# Patient Record
Sex: FEMALE | Race: WHITE | NOT HISPANIC OR LATINO | Employment: FULL TIME | URBAN - METROPOLITAN AREA
[De-identification: names, ages, dates, MRNs, and addresses within clinical notes are randomized per-mention and may not be internally consistent; named-entity substitution may affect disease eponyms.]

---

## 2017-02-08 ENCOUNTER — HOSPITAL ENCOUNTER (OUTPATIENT)
Dept: URGENT CARE | Facility: CLINIC | Age: 22
Setting detail: SPECIMEN
Discharge: HOME | End: 2017-02-08
Attending: FAMILY MEDICINE | Admitting: FAMILY MEDICINE

## 2017-02-08 LAB
BACTERIA SPEC AEROBE CULT: NORMAL
Lab: NORMAL
MICRO REPORT STATUS: NORMAL
SPECIMEN SOURCE: NORMAL

## 2017-02-15 ENCOUNTER — HOSPITAL ENCOUNTER (OUTPATIENT)
Dept: PREADMISSION TESTING | Facility: HOSPITAL | Age: 22
Discharge: HOME | End: 2017-02-15
Attending: ORTHOPAEDIC SURGERY | Admitting: ORTHOPAEDIC SURGERY

## 2017-02-15 LAB
AMPHETAMINES UR QL SCN: NEGATIVE
ANION GAP SERPL CALC-SCNC: 8.6 MMOL/L (ref 10–20)
BARBITURATES UR QL SCN: NEGATIVE
BASOPHILS # BLD AUTO: 0 10*3/UL (ref 0–0.2)
BASOPHILS NFR BLD AUTO: 1 % (ref 0–2)
BENZODIAZ UR QL SCN: NEGATIVE
BUN SERPL-MCNC: 4 MG/DL (ref 8–20)
BUN/CREAT SERPL: 5 (ref 5.4–26.2)
BZE UR QL SCN: NEGATIVE
CALCIUM SERPL-MCNC: 9.1 MG/DL (ref 8.9–10.3)
CHLORIDE SERPL-SCNC: 105 MMOL/L (ref 101–111)
CONV CO2: 25 MMOL/L (ref 22–32)
CREAT 24H UR-MCNC: NORMAL MG/DL
CREAT UR-MCNC: 0.8 MG/DL (ref 0.4–1)
DIFFERENTIAL METHOD BLD: (no result)
EOSINOPHIL # BLD AUTO: 0.1 10*3/UL (ref 0–0.3)
EOSINOPHIL # BLD AUTO: 1 % (ref 0–3)
ERYTHROCYTE [DISTWIDTH] IN BLOOD BY AUTOMATED COUNT: 13 % (ref 11.5–14.5)
GLUCOSE SERPL-MCNC: 81 MG/DL (ref 65–99)
HCT VFR BLD AUTO: 37.4 % (ref 35–49)
HGB BLD-MCNC: 12.8 G/DL (ref 12–15)
LYMPHOCYTES # BLD AUTO: 1.4 10*3/UL (ref 0.8–4.8)
LYMPHOCYTES NFR BLD AUTO: 22 % (ref 18–42)
MCH RBC QN AUTO: 30.1 PG (ref 26–32)
MCHC RBC AUTO-ENTMCNC: 34.3 G/DL (ref 32–36)
MCV RBC AUTO: 87.7 FL (ref 80–94)
METHADONE UR QL SCN: NEGATIVE
MONOCYTES # BLD AUTO: 0.8 10*3/UL (ref 0.1–1.3)
MONOCYTES NFR BLD AUTO: 12 % (ref 2–11)
NEUTROPHILS # BLD AUTO: 4 10*3/UL (ref 2.3–8.6)
NEUTROPHILS NFR BLD AUTO: 64 % (ref 50–75)
NRBC BLD AUTO-RTO: 0 /100{WBCS}
NRBC/RBC NFR BLD MANUAL: 0 10*3/UL
OPIATES TESTED UR SCN: NEGATIVE
PCP UR QL: NEGATIVE
PLATELET # BLD AUTO: 200 10*3/UL (ref 150–450)
PMV BLD AUTO: 9.1 FL (ref 7.4–10.4)
POTASSIUM SERPL-SCNC: 3.6 MMOL/L (ref 3.6–5.1)
RBC # BLD AUTO: 4.27 10*6/UL (ref 4–5.4)
SODIUM SERPL-SCNC: 135 MMOL/L (ref 136–144)
THC SERPLBLD CFM-MCNC: NEGATIVE NG/ML
WBC # BLD AUTO: 6.3 10*3/UL (ref 4.5–11.5)

## 2018-08-14 ENCOUNTER — HOSPITAL ENCOUNTER (OUTPATIENT)
Dept: FAMILY MEDICINE CLINIC | Facility: CLINIC | Age: 23
Setting detail: SPECIMEN
Discharge: HOME | End: 2018-08-14
Attending: PHYSICIAN ASSISTANT | Admitting: PHYSICIAN ASSISTANT

## 2018-08-14 LAB
AMPICILLIN SUSC ISLT: NORMAL
AZTREONAM SUSC ISLT: NORMAL
BACTERIA ISLT: NORMAL
BACTERIA SPEC AEROBE CULT: NORMAL
CEFAZOLIN SUSC ISLT: NORMAL
CEFEPIME SUSC ISLT: NORMAL
CEFTAZIDIME SUSC ISLT: NORMAL
CEFTRIAXONE SUSC ISLT: NORMAL
CIPROFLOXACIN SUSC ISLT: NORMAL
ERTAPENEM SUSC ISLT: NORMAL
GRAM STN SPEC: NORMAL
LEVOFLOXACIN SUSC ISLT: NORMAL
Lab: NORMAL
MEROPENEM SUSC ISLT: NORMAL
MICRO REPORT STATUS: NORMAL
PIP+TAZO SUSC ISLT: NORMAL
SPECIMEN SOURCE: NORMAL
SUSC METH SPEC: NORMAL
TETRACYCLINE SUSC ISLT: NORMAL
TOBRAMYCIN SUSC ISLT: NORMAL
TRIMETHOPRIM/SULFA: NORMAL

## 2019-05-02 ENCOUNTER — HOSPITAL ENCOUNTER (OUTPATIENT)
Dept: LAB | Facility: HOSPITAL | Age: 24
Discharge: HOME | End: 2019-05-02
Attending: OBSTETRICS & GYNECOLOGY | Admitting: OBSTETRICS & GYNECOLOGY

## 2019-07-03 ENCOUNTER — OFFICE VISIT (OUTPATIENT)
Dept: FAMILY MEDICINE CLINIC | Facility: CLINIC | Age: 24
End: 2019-07-03

## 2019-07-03 VITALS
HEIGHT: 60 IN | OXYGEN SATURATION: 100 % | WEIGHT: 114 LBS | HEART RATE: 100 BPM | SYSTOLIC BLOOD PRESSURE: 116 MMHG | DIASTOLIC BLOOD PRESSURE: 79 MMHG | BODY MASS INDEX: 22.38 KG/M2 | RESPIRATION RATE: 16 BRPM | TEMPERATURE: 98.1 F

## 2019-07-03 DIAGNOSIS — G43.109 MIGRAINE WITH AURA AND WITHOUT STATUS MIGRAINOSUS, NOT INTRACTABLE: Primary | ICD-10-CM

## 2019-07-03 PROBLEM — L08.9 WOUND INFECTION: Status: ACTIVE | Noted: 2018-08-14

## 2019-07-03 PROBLEM — S40.819S: Status: ACTIVE | Noted: 2018-08-14

## 2019-07-03 PROBLEM — F41.9 ANXIETY: Status: ACTIVE | Noted: 2017-10-26

## 2019-07-03 PROBLEM — G89.29 CHRONIC HEADACHE: Status: ACTIVE | Noted: 2017-11-29

## 2019-07-03 PROBLEM — R63.5 WEIGHT GAIN: Status: ACTIVE | Noted: 2018-02-23

## 2019-07-03 PROBLEM — J45.909 ASTHMA: Status: ACTIVE | Noted: 2017-11-20

## 2019-07-03 PROBLEM — IMO0002: Status: ACTIVE | Noted: 2018-08-14

## 2019-07-03 PROBLEM — Z30.9 CONTRACEPTION MANAGEMENT: Status: ACTIVE | Noted: 2018-02-23

## 2019-07-03 PROBLEM — Z87.440 HX OF RECURRENT URINARY TRACT INFECTION: Status: ACTIVE | Noted: 2017-02-08

## 2019-07-03 PROBLEM — R51.9 CHRONIC HEADACHE: Status: ACTIVE | Noted: 2017-11-29

## 2019-07-03 PROBLEM — T14.8XXA WOUND INFECTION: Status: ACTIVE | Noted: 2018-08-14

## 2019-07-03 PROCEDURE — 99213 OFFICE O/P EST LOW 20 MIN: CPT | Performed by: FAMILY MEDICINE

## 2019-07-03 RX ORDER — ALBUTEROL SULFATE 90 UG/1
2 AEROSOL, METERED RESPIRATORY (INHALATION) EVERY 4 HOURS PRN
COMMUNITY
Start: 2017-11-20

## 2019-07-03 RX ORDER — PROMETHAZINE HYDROCHLORIDE 12.5 MG/1
25 TABLET ORAL EVERY 6 HOURS PRN
COMMUNITY
End: 2019-07-03 | Stop reason: SDUPTHER

## 2019-07-03 RX ORDER — ONDANSETRON 4 MG/1
TABLET, ORALLY DISINTEGRATING ORAL
COMMUNITY
Start: 2019-06-10 | End: 2020-03-19

## 2019-07-03 RX ORDER — TOPIRAMATE 25 MG/1
TABLET ORAL
Qty: 70 TABLET | Refills: 0 | Status: SHIPPED | OUTPATIENT
Start: 2019-07-03 | End: 2019-09-06

## 2019-07-03 RX ORDER — PREDNISONE 10 MG/1
TABLET ORAL SEE ADMIN INSTRUCTIONS
Refills: 0 | COMMUNITY
Start: 2019-06-10 | End: 2019-07-03

## 2019-07-03 RX ORDER — DIHYDROERGOTAMINE MESYLATE 4 MG/ML
1 SPRAY NASAL AS NEEDED
Qty: 1 EACH | Refills: 12
Start: 2019-07-03 | End: 2019-07-10 | Stop reason: SDUPTHER

## 2019-07-03 RX ORDER — NORELGESTROMIN AND ETHINYL ESTRADIOL 150; 35 UG/D; UG/D
PATCH TRANSDERMAL
Refills: 11 | COMMUNITY
Start: 2019-06-08 | End: 2020-03-19

## 2019-07-03 RX ORDER — PROMETHAZINE HYDROCHLORIDE 12.5 MG/1
12.5 TABLET ORAL EVERY 6 HOURS PRN
Qty: 40 TABLET | Refills: 0 | Status: SHIPPED | OUTPATIENT
Start: 2019-07-03

## 2019-07-03 RX ORDER — HYDROCODONE BITARTRATE AND ACETAMINOPHEN 7.5; 325 MG/1; MG/1
TABLET ORAL EVERY 4 HOURS
COMMUNITY
Start: 2018-08-14 | End: 2019-07-03

## 2019-07-03 RX ORDER — IBUPROFEN 600 MG/1
TABLET ORAL
COMMUNITY
Start: 2018-08-10 | End: 2019-09-06

## 2019-07-03 RX ORDER — RIZATRIPTAN BENZOATE 10 MG/1
10 TABLET, ORALLY DISINTEGRATING ORAL ONCE AS NEEDED
COMMUNITY
End: 2019-07-03

## 2019-07-03 NOTE — PROGRESS NOTES
Subjective   Argenis Decker is a 23 y.o. female.     Chief Complaint   Patient presents with   • Migraine         Current Outpatient Medications:   •  albuterol sulfate HFA (PROAIR HFA) 108 (90 Base) MCG/ACT inhaler, PROAIR  (90 Base) MCG/ACT AERS, Disp: , Rfl:   •  fluticasone-salmeterol (ADVAIR) 100-50 MCG/DOSE DISKUS, INHALE 1 PUFF BY MOUTH EVERY TWELVE HOURS, Disp: , Rfl: 1  •  ibuprofen (IBU) 600 MG tablet,  MG TABS, Disp: , Rfl:   •  ondansetron ODT (ZOFRAN-ODT) 4 MG disintegrating tablet, , Disp: , Rfl:   •  promethazine (PHENERGAN) 12.5 MG tablet, Take 1 tablet by mouth Every 6 (Six) Hours As Needed for Nausea or Vomiting., Disp: 40 tablet, Rfl: 0  •  XULANE 150-35 MCG/24HR, APPLY 1 PATCH EXTERNALLY ONE TIME A WEEK, Disp: , Rfl: 11  •  dihydroergotamine (MIGRANAL) 4 MG/ML nasal spray, 1 spray into the nostril(s) as directed by provider As Needed for Migraine. Spray in each nostril prh MIGRAINE and may repeat x 1 in 15min, Disp: 1 each, Rfl: 12  •  topiramate (TOPAMAX) 25 MG tablet, 25mg po qhs x 1 wk then go to 1 po bid x 1wk then 1 po qam and 2 po qhs x 1 wk then go to 2 po bid, Disp: 70 tablet, Rfl: 0    Past Medical History:   Diagnosis Date   • Angioedema    • Anxiety    • Asthma    • Migraines    • Neck pain    • Panic attacks    • Schwannoma    • Seasonal depression (CMS/HCC)    • Visual impairment     computer/ studying only       Past Surgical History:   Procedure Laterality Date   • BREAST AUGMENTATION Bilateral 10/08/2015   • CERVICAL SPINE POSTERIOR  02/20/2017    removed tumor from post c-spine   • EAR TUBES  1997   • WISDOM TOOTH EXTRACTION  2013       Family History   Problem Relation Age of Onset   • Hypertension Mother    • Cancer Mother         BCC   • Hypertension Father    • Other Father         GLAUCOMA   • Heart disease Maternal Grandmother    • Cancer Maternal Grandfather         Skin Cancer   • Cancer Paternal Grandmother         Skin, Breast and Lung Cancer   • Heart  disease Paternal Grandfather    • Heart disease Other         Grandparents   • Asthma Sister    • Other Sister         ADD       Social History     Socioeconomic History   • Marital status: Single     Spouse name: Not on file   • Number of children: Not on file   • Years of education: Not on file   • Highest education level: Not on file   Tobacco Use   • Smoking status: Never Smoker   • Smokeless tobacco: Never Used   • Tobacco comment: Passive Smoke: N   Substance and Sexual Activity   • Alcohol use: Yes     Alcohol/week: 0.6 oz     Types: 1 Cans of beer per week     Frequency: Never   • Drug use: No       22y/o C female here for f/u on HA's w/  Mom at her side    Pt has had migraines since HS---she had sx for a cervical mass in 2017 and noticed no HA's x 4-5mos; then HA's started coming back and on Monday last week she had a MIGRAINE x 6 days straight----Pt tried excedrin at first w/ a ms relaxer w/o success so went to  on Friday for Toradol/ phenergan IM and sent home w/ maxalt/ steroid joan and phenergan-----pt took the steroid sat x 1 day w/ good results so didn't take any more and has been good ever since    Pt tried 2 dif triptan meds but had side effects         The following portions of the patient's history were reviewed and updated as appropriate: allergies, current medications, past family history, past medical history, past social history, past surgical history and problem list.    Review of Systems   Constitutional: Negative for fever.   Eyes: Positive for blurred vision, photophobia and visual disturbance. Negative for double vision.   Skin: Negative for rash.   Neurological: Positive for headache. Negative for syncope, facial asymmetry, speech difficulty, weakness, light-headedness, memory problem and confusion.       Vitals:    07/03/19 1034   BP: 116/79   Pulse: 100   Resp: 16   Temp: 98.1 °F (36.7 °C)   SpO2: 100%       Objective   Physical Exam   Constitutional: She is oriented to person, place,  and time. She appears well-developed and well-nourished.   Cardiovascular: Normal rate, regular rhythm, normal heart sounds and intact distal pulses.   No murmur heard.  Pulmonary/Chest: Effort normal and breath sounds normal. No respiratory distress.   Neurological: She is alert and oriented to person, place, and time. No cranial nerve deficit.   Skin: Skin is warm and dry.   Psychiatric: She has a normal mood and affect. Her behavior is normal. Judgment and thought content normal.   Nursing note and vitals reviewed.        Assessment/Plan   Argenis was seen today for migraine.    Diagnoses and all orders for this visit:    Migraine with aura and without status migrainosus, not intractable  -     topiramate (TOPAMAX) 25 MG tablet; 25mg po qhs x 1 wk then go to 1 po bid x 1wk then 1 po qam and 2 po qhs x 1 wk then go to 2 po bid  -     dihydroergotamine (MIGRANAL) 4 MG/ML nasal spray; 1 spray into the nostril(s) as directed by provider As Needed for Migraine. Spray in each nostril prh MIGRAINE and may repeat x 1 in 15min    Other orders  -     promethazine (PHENERGAN) 12.5 MG tablet; Take 1 tablet by mouth Every 6 (Six) Hours As Needed for Nausea or Vomiting.

## 2019-07-04 NOTE — PATIENT INSTRUCTIONS
Disc'd Migraine triggers and tx at length w/ pt/ mom.....Discussed with the patient and all questioned fully answered. She will call me if any problems arise.

## 2019-07-10 DIAGNOSIS — G43.109 MIGRAINE WITH AURA AND WITHOUT STATUS MIGRAINOSUS, NOT INTRACTABLE: ICD-10-CM

## 2019-07-10 NOTE — TELEPHONE ENCOUNTER
Dr Abdalla gave sample of Migranal nasal spray at appt 7/3 that works really well. Pt would like an Rx sent to [facility], please.

## 2019-07-11 RX ORDER — DIHYDROERGOTAMINE MESYLATE 4 MG/ML
1 SPRAY NASAL AS NEEDED
Qty: 1 EACH | Refills: 1 | Status: SHIPPED | OUTPATIENT
Start: 2019-07-11 | End: 2019-07-15 | Stop reason: SDUPTHER

## 2019-07-12 ENCOUNTER — TELEPHONE (OUTPATIENT)
Dept: FAMILY MEDICINE CLINIC | Facility: CLINIC | Age: 24
End: 2019-07-12

## 2019-07-12 NOTE — TELEPHONE ENCOUNTER
Pt states she was given samples by Dr Abdalla for Migranal nasal spray and would like a rx for it sent to [facility] [address]

## 2019-07-15 DIAGNOSIS — G43.109 MIGRAINE WITH AURA AND WITHOUT STATUS MIGRAINOSUS, NOT INTRACTABLE: ICD-10-CM

## 2019-07-15 RX ORDER — DIHYDROERGOTAMINE MESYLATE 4 MG/ML
1 SPRAY NASAL AS NEEDED
Qty: 1 EACH | Refills: 1 | Status: SHIPPED | OUTPATIENT
Start: 2019-07-15 | End: 2021-02-10

## 2019-09-02 DIAGNOSIS — G43.109 MIGRAINE WITH AURA AND WITHOUT STATUS MIGRAINOSUS, NOT INTRACTABLE: ICD-10-CM

## 2019-09-03 RX ORDER — TOPIRAMATE 25 MG/1
TABLET ORAL
Qty: 70 TABLET | Refills: 0 | OUTPATIENT
Start: 2019-09-03

## 2019-09-03 NOTE — TELEPHONE ENCOUNTER
Last visit:7/3/19  Next visit: 9/6/19  Last labs:  4/18/18    Rx requested: Topirimate  Pharmacy: [facility] in [place]

## 2019-09-06 ENCOUNTER — OFFICE VISIT (OUTPATIENT)
Dept: FAMILY MEDICINE CLINIC | Facility: CLINIC | Age: 24
End: 2019-09-06

## 2019-09-06 VITALS
BODY MASS INDEX: 21.79 KG/M2 | DIASTOLIC BLOOD PRESSURE: 80 MMHG | OXYGEN SATURATION: 100 % | TEMPERATURE: 98.8 F | RESPIRATION RATE: 14 BRPM | HEIGHT: 60 IN | WEIGHT: 111 LBS | HEART RATE: 78 BPM | SYSTOLIC BLOOD PRESSURE: 113 MMHG

## 2019-09-06 DIAGNOSIS — G43.809 OTHER MIGRAINE WITHOUT STATUS MIGRAINOSUS, NOT INTRACTABLE: Primary | ICD-10-CM

## 2019-09-06 DIAGNOSIS — F32.89 OTHER DEPRESSION: ICD-10-CM

## 2019-09-06 PROCEDURE — 99213 OFFICE O/P EST LOW 20 MIN: CPT | Performed by: FAMILY MEDICINE

## 2019-09-06 RX ORDER — TOPIRAMATE 50 MG/1
1 CAPSULE, EXTENDED RELEASE ORAL NIGHTLY
Qty: 30 CAPSULE | Refills: 3 | Status: SHIPPED | OUTPATIENT
Start: 2019-09-06 | End: 2019-09-10

## 2019-09-06 RX ORDER — MULTIPLE VITAMINS W/ MINERALS TAB 9MG-400MCG
1 TAB ORAL DAILY
COMMUNITY
End: 2021-10-07

## 2019-09-06 RX ORDER — IBUPROFEN 800 MG/1
800 TABLET ORAL EVERY 6 HOURS PRN
Qty: 120 TABLET | Refills: 1 | Status: SHIPPED | OUTPATIENT
Start: 2019-09-06 | End: 2019-11-01 | Stop reason: SDUPTHER

## 2019-09-06 RX ORDER — SERTRALINE HYDROCHLORIDE 25 MG/1
25 TABLET, FILM COATED ORAL NIGHTLY
Qty: 90 TABLET | Refills: 1 | Status: SHIPPED | OUTPATIENT
Start: 2019-09-06 | End: 2021-02-02

## 2019-09-10 RX ORDER — TOPIRAMATE 50 MG/1
50 TABLET, FILM COATED ORAL NIGHTLY
Qty: 90 TABLET | Refills: 1 | Status: SHIPPED | OUTPATIENT
Start: 2019-09-10 | End: 2020-03-19

## 2019-09-10 NOTE — TELEPHONE ENCOUNTER
Pt called asking to go back on the plain Topamax 50mg qhs. The XR did not help, and she did better on the plain. - Aicha

## 2019-09-12 ENCOUNTER — TELEPHONE (OUTPATIENT)
Dept: FAMILY MEDICINE CLINIC | Facility: CLINIC | Age: 24
End: 2019-09-12

## 2019-09-12 NOTE — TELEPHONE ENCOUNTER
Patient called stating that she was just in to see  recently and she still has the migraine shes had constant for 4-5 days and she is wanting to know if you can call in Ketoralac injection for her and she can give it to herself at home. She had one of these injections and it worked. Patient has tried taking Ibuprofen and 2 Tylenol ES and nothing is helping,she has also taken the Topamax. Patient uses the [facility] on [facility]. Patient stated when we call her back we can leave a VM if she doesn't answer. [phone].

## 2019-09-12 NOTE — TELEPHONE ENCOUNTER
I know but I do not think that is an option----she can come in and get a 30mg IM shot if she wants

## 2019-09-12 NOTE — TELEPHONE ENCOUNTER
We do, in 30mg and 60mg,...but these are drawn up in the office like b12 inj. The pt wants to be able to give it to herself at home p/ Milena.

## 2019-09-13 NOTE — TELEPHONE ENCOUNTER
"Pt informed and she says she can't leave work today for this migraine. But she has tried everything and it will not go away. Her migraines have become more frequent, daily now, and she can't come in every day for an injection. This is why she was asking for home injections. She is to the point that she may have to take a Norco, it hurts so bad. \"It's ruining my life.\" She wants to know if she can increase her Topamax to 75qhs?   "

## 2019-10-06 PROBLEM — F33.8 SEASONAL DEPRESSION: Status: ACTIVE | Noted: 2019-10-06

## 2019-10-06 PROBLEM — T14.8XXA WOUND INFECTION: Status: RESOLVED | Noted: 2018-08-14 | Resolved: 2019-10-06

## 2019-10-06 PROBLEM — L08.9 WOUND INFECTION: Status: RESOLVED | Noted: 2018-08-14 | Resolved: 2019-10-06

## 2019-11-01 RX ORDER — IBUPROFEN 800 MG/1
TABLET ORAL
Qty: 120 TABLET | Refills: 0 | Status: SHIPPED | OUTPATIENT
Start: 2019-11-01 | End: 2022-09-16

## 2019-12-05 ENCOUNTER — TELEPHONE (OUTPATIENT)
Dept: FAMILY MEDICINE CLINIC | Facility: CLINIC | Age: 24
End: 2019-12-05

## 2019-12-05 NOTE — TELEPHONE ENCOUNTER
Patient called stating that she has a massive migraine and wants to know if you can call in Antivert because the zofran and she also has a script for phenergan and nothing is working.

## 2019-12-06 RX ORDER — MECLIZINE HYDROCHLORIDE 25 MG/1
25 TABLET ORAL 3 TIMES DAILY PRN
Qty: 90 TABLET | Refills: 0 | Status: SHIPPED | OUTPATIENT
Start: 2019-12-06

## 2019-12-06 NOTE — TELEPHONE ENCOUNTER
Pt states the Antivert is for her Vertigo which makes the migraines worse. She takes topamax for the migraines, but needs the med for the dizziness.     Triptans do not work for her anyway.

## 2019-12-10 ENCOUNTER — TELEPHONE (OUTPATIENT)
Dept: FAMILY MEDICINE CLINIC | Facility: CLINIC | Age: 24
End: 2019-12-10

## 2019-12-10 NOTE — TELEPHONE ENCOUNTER
Pt states she thinks she is having a reaction to the increased Zoloft.  Yawning nonstop and the pharm at [facility] said that could be a reaction.  Also takes Topamax and she states this is the only thing that helps her migraines.  She is hoping its not from taking them together.    Last seen 9/19  Last labs 4/18  [facility]

## 2019-12-10 NOTE — TELEPHONE ENCOUNTER
When did she increase the Zoloft? Looks like I sent in 25mg #90 in SEPT not the 50mg?  What dose of topamax is she currently taking?

## 2019-12-11 NOTE — TELEPHONE ENCOUNTER
I called the patient and she said that she started taking the zoloft 50mg last week because it is winter and she has been really tired. The topamax she is on 50mg 1 po hs.

## 2020-03-19 ENCOUNTER — OFFICE VISIT (OUTPATIENT)
Dept: FAMILY MEDICINE CLINIC | Facility: CLINIC | Age: 25
End: 2020-03-19

## 2020-03-19 VITALS
WEIGHT: 111 LBS | SYSTOLIC BLOOD PRESSURE: 118 MMHG | OXYGEN SATURATION: 99 % | HEIGHT: 60 IN | TEMPERATURE: 97.9 F | HEART RATE: 108 BPM | DIASTOLIC BLOOD PRESSURE: 84 MMHG | BODY MASS INDEX: 21.79 KG/M2 | RESPIRATION RATE: 16 BRPM

## 2020-03-19 DIAGNOSIS — J45.20 MILD INTERMITTENT ASTHMA WITHOUT COMPLICATION: Primary | ICD-10-CM

## 2020-03-19 DIAGNOSIS — R11.0 NAUSEA: ICD-10-CM

## 2020-03-19 DIAGNOSIS — G43.101 MIGRAINE WITH AURA AND WITH STATUS MIGRAINOSUS, NOT INTRACTABLE: ICD-10-CM

## 2020-03-19 PROCEDURE — 99213 OFFICE O/P EST LOW 20 MIN: CPT | Performed by: FAMILY MEDICINE

## 2020-03-19 RX ORDER — ONDANSETRON 4 MG/1
4 TABLET, FILM COATED ORAL EVERY 8 HOURS PRN
Qty: 30 TABLET | Refills: 1 | Status: SHIPPED | OUTPATIENT
Start: 2020-03-19 | End: 2021-08-19 | Stop reason: SDUPTHER

## 2020-03-19 RX ORDER — TOPIRAMATE 25 MG/1
TABLET ORAL
Qty: 60 TABLET | Refills: 2 | Status: SHIPPED | OUTPATIENT
Start: 2020-03-19 | End: 2020-09-28

## 2020-03-19 RX ORDER — METHOCARBAMOL 500 MG/1
500 TABLET, FILM COATED ORAL 2 TIMES DAILY PRN
Qty: 30 TABLET | Refills: 1 | Status: SHIPPED | OUTPATIENT
Start: 2020-03-19 | End: 2021-08-19 | Stop reason: SDUPTHER

## 2020-03-19 NOTE — PROGRESS NOTES
Subjective   Argenis Decker is a 24 y.o. female.     Chief Complaint   Patient presents with   • Migraine     patient is getting migraines from the new IUD.          Current Outpatient Medications:   •  albuterol sulfate HFA (PROAIR HFA) 108 (90 Base) MCG/ACT inhaler, PROAIR  (90 Base) MCG/ACT AERS, Disp: , Rfl:   •  dihydroergotamine (MIGRANAL) 4 MG/ML nasal spray, 1 spray into the nostril(s) as directed by provider As Needed for Migraine. Spray in each nostril prh MIGRAINE and may repeat x 1 in 15min, Disp: 1 each, Rfl: 1  •  fluticasone-salmeterol (ADVAIR) 100-50 MCG/DOSE DISKUS, Inhale 1 puff 2 (Two) Times a Day., Disp: 60 each, Rfl: 1  •  ibuprofen (ADVIL,MOTRIN) 800 MG tablet, TAKE 1 TABLET BY MOUTH EVERY SIX HOURS AS NEEDED FOR mild PAIN, Disp: 120 tablet, Rfl: 0  •  meclizine (ANTIVERT) 25 MG tablet, Take 1 tablet by mouth 3 (Three) Times a Day As Needed for Dizziness., Disp: 90 tablet, Rfl: 0  •  Multiple Vitamins-Minerals (MULTIVITAMIN WITH MINERALS) tablet tablet, Take 1 tablet by mouth Daily., Disp: , Rfl:   •  promethazine (PHENERGAN) 12.5 MG tablet, Take 1 tablet by mouth Every 6 (Six) Hours As Needed for Nausea or Vomiting., Disp: 40 tablet, Rfl: 0  •  sertraline (ZOLOFT) 25 MG tablet, Take 1 tablet by mouth Every Night., Disp: 90 tablet, Rfl: 1  •  topiramate (TOPAMAX) 25 MG tablet, 1 tabs po bid, Disp: 60 tablet, Rfl: 2  •  levonorgestrel (Kyleena) 19.5 MG intrauterine device IUD, 1 each by Intrauterine route 1 (One) Time for 1 dose., Disp: 1 each, Rfl: 0  •  methocarbamol (Robaxin) 500 MG tablet, Take 1 tablet by mouth 2 (Two) Times a Day As Needed for Muscle Spasms., Disp: 30 tablet, Rfl: 1  •  ondansetron (Zofran) 4 MG tablet, Take 1 tablet by mouth Every 8 (Eight) Hours As Needed for Nausea or Vomiting., Disp: 30 tablet, Rfl: 1    Past Medical History:   Diagnosis Date   • Angioedema    • Anxiety    • Asthma    • Neck pain    • Panic attacks    • Schwannoma    • Seasonal depression  (CMS/[facility])    • Visual impairment     computer/ studying only       Past Surgical History:   Procedure Laterality Date   • BREAST AUGMENTATION Bilateral 10/08/2015   • CERVICAL SPINE POSTERIOR  02/20/2017    removed tumor from post c-spine   • EAR TUBES  1997   • WISDOM TOOTH EXTRACTION  2013       Family History   Problem Relation Age of Onset   • Hypertension Mother    • Cancer Mother         BCC   • Hypertension Father    • Other Father         GLAUCOMA   • Heart disease Maternal Grandmother    • Cancer Maternal Grandfather         Skin Cancer   • Cancer Paternal Grandmother         Skin, Breast and Lung Cancer   • Heart disease Paternal Grandfather    • Heart disease Other         Grandparents   • Asthma Sister    • Other Sister         ADD       Social History     Socioeconomic History   • Marital status: Single     Spouse name: Not on file   • Number of children: Not on file   • Years of education: Not on file   • Highest education level: Not on file   Tobacco Use   • Smoking status: Never Smoker   • Smokeless tobacco: Never Used   • Tobacco comment: Passive Smoke: N   Substance and Sexual Activity   • Alcohol use: Yes     Alcohol/week: 1.0 standard drinks     Types: 1 Cans of beer per week     Frequency: Never   • Drug use: No   • Sexual activity: Defer       25y/o C female here w/ c/o's HA    Pt got her IUD a week ago (lower hormone dose than the Mirena) and had been on birth control patch---- having some vag bleeding x 3 days so far    Pt gets a cluster of migraines off/on and will take Topamax for a few days (since taking it every day causes anorexia/ suicidal thots) and it usu resolves; tried the Trokendi XR but didn't work    Pt states she got a Migraine cluster after the IUD was put in and states it didn't respond like it has in the past to tx    HA causes visual issues and usu left sided ant/ post; Pt tried an old ms relaxer and it seemed to work really well (robaxin she had left over from her breast  skristin)    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    Review of Systems   Constitutional: Positive for activity change. Negative for appetite change.   Eyes: Positive for blurred vision and visual disturbance.   Gastrointestinal: Positive for nausea. Negative for vomiting.   Genitourinary: Positive for vaginal bleeding.   Neurological: Positive for headache.   Psychiatric/Behavioral: Negative for sleep disturbance.       Vitals:    03/19/20 0833   BP: 118/84   Pulse: 108   Resp: 16   Temp: 97.9 °F (36.6 °C)   SpO2: 99%       Objective   Physical Exam   Constitutional: She is oriented to person, place, and time. She appears well-developed and well-nourished.   Cardiovascular: Normal rate, regular rhythm and normal heart sounds.   No murmur heard.  Pulmonary/Chest: Effort normal and breath sounds normal. No respiratory distress.   Neurological: She is alert and oriented to person, place, and time. No cranial nerve deficit.   Skin: Skin is warm and dry.   Psychiatric: She has a normal mood and affect. Her behavior is normal. Judgment and thought content normal.   Nursing note and vitals reviewed.        Assessment/Plan   Argenis was seen today for migraine.    Diagnoses and all orders for this visit:    Mild intermittent asthma without complication    Migraine with aura and with status migrainosus, not intractable  -     topiramate (TOPAMAX) 25 MG tablet; 1 tabs po bid    Nausea    Other orders  -     levonorgestrel (Kyleena) 19.5 MG intrauterine device IUD; 1 each by Intrauterine route 1 (One) Time for 1 dose.  -      fluticasone-salmeterol (ADVAIR) 100-50 MCG/DOSE DISKUS; Inhale 1 puff 2 (Two) Times a Day.  -     ondansetron (Zofran) 4 MG tablet; Take 1 tablet by mouth Every 8 (Eight) Hours As Needed for Nausea or Vomiting.  -     methocarbamol (Robaxin) 500 MG tablet; Take 1 tablet by mouth 2 (Two) Times a Day As Needed for Muscle Spasms.      Disc'd trying ms relaxer at low dose prn vs new migraine med sample vs lower bid dosing of the topamax

## 2020-07-15 DIAGNOSIS — G43.109 MIGRAINE WITH AURA AND WITHOUT STATUS MIGRAINOSUS, NOT INTRACTABLE: ICD-10-CM

## 2020-07-15 RX ORDER — TOPIRAMATE 50 MG/1
TABLET, FILM COATED ORAL
Qty: 90 TABLET | Refills: 0 | Status: SHIPPED | OUTPATIENT
Start: 2020-07-15 | End: 2020-09-28

## 2020-07-15 NOTE — TELEPHONE ENCOUNTER
Last visit:  3/19/20  Next visit: none  Last labs: 4/18/18    Rx requested:Topirimate   Pharmacy: [facility] in [place]

## 2020-07-21 ENCOUNTER — TELEPHONE (OUTPATIENT)
Dept: FAMILY MEDICINE CLINIC | Facility: CLINIC | Age: 25
End: 2020-07-21

## 2020-07-22 NOTE — TELEPHONE ENCOUNTER
Can she come in and leave a UA on WED????  I can do a tele visit..........but I still need the urine

## 2020-09-26 DIAGNOSIS — G43.109 MIGRAINE WITH AURA AND WITHOUT STATUS MIGRAINOSUS, NOT INTRACTABLE: ICD-10-CM

## 2020-09-28 RX ORDER — TOPIRAMATE 50 MG/1
TABLET, FILM COATED ORAL
Qty: 90 TABLET | Refills: 0 | Status: SHIPPED | OUTPATIENT
Start: 2020-09-28 | End: 2021-02-02 | Stop reason: SDUPTHER

## 2020-09-28 NOTE — TELEPHONE ENCOUNTER
Last visit: 3/19/20  Next visit: none  Last lab4/18/20    Rx requested: topiramate (TOPAMAX) 50   Pharmacy: [facility] in [place]

## 2020-10-04 ENCOUNTER — TELEPHONE (OUTPATIENT)
Dept: FAMILY MEDICINE CLINIC | Facility: CLINIC | Age: 25
End: 2020-10-04

## 2020-10-04 RX ORDER — HYDROXYZINE PAMOATE 25 MG/1
25 CAPSULE ORAL 3 TIMES DAILY PRN
Qty: 20 CAPSULE | Refills: 0 | Status: SHIPPED | OUTPATIENT
Start: 2020-10-04 | End: 2021-07-21 | Stop reason: SDUPTHER

## 2020-10-04 NOTE — TELEPHONE ENCOUNTER
ON CALL NOTE  Pt called the answering service stating that she has had 3 panic attacks today and is very tearful.  Denies SI or HI. Reports she has taken 2 benadryl, which has helped some.  Takes zoloft during the winter but is not currently on it.  I informed her that I will call in vistaril to take as needed.  She is going to follow up with PCP tomorrow.

## 2020-10-16 ENCOUNTER — OFFICE VISIT (OUTPATIENT)
Dept: FAMILY MEDICINE CLINIC | Facility: CLINIC | Age: 25
End: 2020-10-16

## 2020-10-16 VITALS
WEIGHT: 109 LBS | HEIGHT: 60 IN | DIASTOLIC BLOOD PRESSURE: 78 MMHG | RESPIRATION RATE: 14 BRPM | SYSTOLIC BLOOD PRESSURE: 112 MMHG | HEART RATE: 75 BPM | BODY MASS INDEX: 21.4 KG/M2 | TEMPERATURE: 96.9 F | OXYGEN SATURATION: 100 %

## 2020-10-16 DIAGNOSIS — G43.109 MIGRAINE WITH AURA AND WITHOUT STATUS MIGRAINOSUS, NOT INTRACTABLE: ICD-10-CM

## 2020-10-16 DIAGNOSIS — J45.20 MILD INTERMITTENT ASTHMA WITHOUT COMPLICATION: ICD-10-CM

## 2020-10-16 DIAGNOSIS — F33.8 SEASONAL DEPRESSION (HCC): ICD-10-CM

## 2020-10-16 DIAGNOSIS — Z00.00 WELLNESS EXAMINATION: Primary | ICD-10-CM

## 2020-10-16 DIAGNOSIS — Z23 IMMUNIZATION DUE: ICD-10-CM

## 2020-10-16 PROCEDURE — 90471 IMMUNIZATION ADMIN: CPT | Performed by: FAMILY MEDICINE

## 2020-10-16 PROCEDURE — 99395 PREV VISIT EST AGE 18-39: CPT | Performed by: FAMILY MEDICINE

## 2020-10-16 PROCEDURE — 90715 TDAP VACCINE 7 YRS/> IM: CPT | Performed by: FAMILY MEDICINE

## 2020-10-16 RX ORDER — SPIRONOLACTONE 50 MG/1
TABLET, FILM COATED ORAL
COMMUNITY
Start: 2020-09-26 | End: 2021-08-19

## 2020-10-16 NOTE — PROGRESS NOTES
Subjective   Argenis Decker is a 25 y.o. female.     Chief Complaint   Patient presents with   • Nursing school requirements     PE         Current Outpatient Medications:   •  albuterol sulfate HFA (PROAIR HFA) 108 (90 Base) MCG/ACT inhaler, PROAIR  (90 Base) MCG/ACT AERS, Disp: , Rfl:   •  dihydroergotamine (MIGRANAL) 4 MG/ML nasal spray, 1 spray into the nostril(s) as directed by provider As Needed for Migraine. Spray in each nostril prh MIGRAINE and may repeat x 1 in 15min, Disp: 1 each, Rfl: 1  •  fluticasone-salmeterol (ADVAIR) 100-50 MCG/DOSE DISKUS, Inhale 1 puff 2 (Two) Times a Day., Disp: 60 each, Rfl: 1  •  hydrOXYzine pamoate (Vistaril) 25 MG capsule, Take 1 capsule by mouth 3 (Three) Times a Day As Needed for Anxiety., Disp: 20 capsule, Rfl: 0  •  ibuprofen (ADVIL,MOTRIN) 800 MG tablet, TAKE 1 TABLET BY MOUTH EVERY SIX HOURS AS NEEDED FOR mild PAIN, Disp: 120 tablet, Rfl: 0  •  meclizine (ANTIVERT) 25 MG tablet, Take 1 tablet by mouth 3 (Three) Times a Day As Needed for Dizziness., Disp: 90 tablet, Rfl: 0  •  methocarbamol (Robaxin) 500 MG tablet, Take 1 tablet by mouth 2 (Two) Times a Day As Needed for Muscle Spasms., Disp: 30 tablet, Rfl: 1  •  Multiple Vitamins-Minerals (MULTIVITAMIN WITH MINERALS) tablet tablet, Take 1 tablet by mouth Daily., Disp: , Rfl:   •  ondansetron (Zofran) 4 MG tablet, Take 1 tablet by mouth Every 8 (Eight) Hours As Needed for Nausea or Vomiting., Disp: 30 tablet, Rfl: 1  •  promethazine (PHENERGAN) 12.5 MG tablet, Take 1 tablet by mouth Every 6 (Six) Hours As Needed for Nausea or Vomiting., Disp: 40 tablet, Rfl: 0  •  sertraline (ZOLOFT) 25 MG tablet, Take 1 tablet by mouth Every Night., Disp: 90 tablet, Rfl: 1  •  spironolactone (ALDACTONE) 50 MG tablet, Take 1 tablet by mouth in the morning and 1/2 tablet at noon., Disp: , Rfl:   •  topiramate (TOPAMAX) 50 MG tablet, TAKE 1 TABLET BY MOUTH ONE TIME A DAY AT NIGHT, Disp: 90 tablet, Rfl: 0    Past Medical History:    Diagnosis Date   • Angioedema    • Anxiety    • Asthma    • Neck pain    • Panic attacks    • Schwannoma    • Seasonal depression (CMS/HCC)    • Visual impairment     computer/ studying only       Past Surgical History:   Procedure Laterality Date   • BREAST AUGMENTATION Bilateral 10/08/2015    Silicone   • EAR TUBES  1997   • GAMMA KNIFE RESECTION OF SCHWANNOMA  02/20/2017     tumor from post c-spine   • WISDOM TOOTH EXTRACTION  2013       Family History   Problem Relation Age of Onset   • Hypertension Mother    • Cancer Mother         BCC   • Hypertension Father    • Other Father         GLAUCOMA   • Heart disease Maternal Grandmother    • Cancer Maternal Grandfather         Skin Cancer   • Cancer Paternal Grandmother         Skin, Breast and Lung Cancer   • Heart disease Paternal Grandfather    • Heart disease Other         Grandparents   • Asthma Sister    • Other Sister         ADD   • Raynaud syndrome Sister        Social History     Socioeconomic History   • Marital status: Single     Spouse name: Not on file   • Number of children: Not on file   • Years of education: Not on file   • Highest education level: Not on file   Tobacco Use   • Smoking status: Never Smoker   • Smokeless tobacco: Never Used   • Tobacco comment: Passive Smoke: N   Substance and Sexual Activity   • Alcohol use: Yes     Alcohol/week: 1.0 standard drinks     Types: 1 Cans of beer per week     Frequency: Never   • Drug use: No   • Sexual activity: Defer       26 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    Review of Systems   Constitutional: Negative.  Negative for activity change, appetite change, fever, unexpected weight gain and unexpected weight loss.   HENT: Negative for congestion, ear pain, hearing loss, nosebleeds, postnasal drip, rhinorrhea, sinus pressure, sneezing, sore throat, tinnitus and trouble swallowing.    Eyes: Negative for blurred vision, double vision and visual disturbance.   Respiratory: Negative for cough and shortness of breath.    Cardiovascular: Negative for chest pain.   Gastrointestinal: Negative for abdominal pain, constipation, diarrhea, nausea, vomiting, GERD and indigestion.   Genitourinary: Negative for difficulty urinating, dysuria, flank pain, frequency, urgency and urinary incontinence.   Musculoskeletal: Negative for arthralgias and myalgias.   Skin: Positive for rash (Acne). Negative for skin lesions.   Neurological: Negative for weakness, memory problem and confusion.   Psychiatric/Behavioral: Negative for agitation, self-injury, suicidal ideas, depressed mood and stress. The patient is not nervous/anxious.        Vitals:    10/16/20 1539   BP: 112/78   Pulse: 75   Resp: 14   Temp: 96.9 °F (36.1 °C)   SpO2: 100%       Objective   Physical Exam  Vitals signs and nursing note reviewed.   Constitutional:       General: She is not in acute distress.     Appearance: Normal appearance. She is well-developed and normal weight. She is not ill-appearing or toxic-appearing.   HENT:      Head: Normocephalic and atraumatic.      Right Ear: Tympanic membrane, ear canal and external ear normal. There is no impacted cerumen.      Left Ear: Tympanic membrane, ear canal and external ear normal. There is no impacted cerumen.      Nose: Nose normal. No congestion or rhinorrhea.      Mouth/Throat:      Mouth: Mucous membranes are moist.      Pharynx: Oropharynx is clear. No oropharyngeal exudate or posterior oropharyngeal erythema.   Eyes:      General:         Right  eye: No discharge.         Left eye: No discharge.      Extraocular Movements: Extraocular movements intact.      Conjunctiva/sclera: Conjunctivae normal.      Pupils: Pupils are equal, round, and reactive to light.   Neck:      Musculoskeletal: Normal range of motion and neck supple.   Cardiovascular:      Rate and Rhythm: Normal rate and regular rhythm.      Heart sounds: Normal heart sounds. No murmur.   Pulmonary:      Effort: Pulmonary effort is normal. No respiratory distress.      Breath sounds: Normal breath sounds. No wheezing, rhonchi or rales.   Abdominal:      General: Abdomen is flat. Bowel sounds are normal. There is no distension.      Palpations: Abdomen is soft.      Tenderness: There is no abdominal tenderness.      Hernia: No hernia is present.   Musculoskeletal: Normal range of motion.         General: No swelling, tenderness or deformity.      Right lower leg: No edema.      Left lower leg: No edema.      Comments: Ms Str = 5/5/ b/l UE and LE     Lymphadenopathy:      Cervical: No cervical adenopathy.   Skin:     General: Skin is warm and dry.      Findings: Acne present.          Neurological:      General: No focal deficit present.      Mental Status: She is alert and oriented to person, place, and time.      Cranial Nerves: Cranial nerves are intact. No cranial nerve deficit.      Motor: Motor function is intact.      Coordination: Coordination is intact.      Gait: Gait is intact.      Deep Tendon Reflexes: Reflexes are normal and symmetric.   Psychiatric:         Mood and Affect: Mood normal.         Behavior: Behavior normal.         Thought Content: Thought content normal.         Judgment: Judgment normal.           Assessment/Plan   Diagnoses and all orders for this visit:    1. Wellness examination (Primary)    2. Seasonal depression (CMS/HCC)    3. Migraine with aura and without status migrainosus, not intractable    4. Mild intermittent asthma without complication    5. Immunization  due  -     Td Vaccine Greater Than or Equal To 8yo With Preservative IM    Other orders  -     fluticasone-salmeterol (ADVAIR) 100-50 MCG/DOSE DISKUS; Inhale 1 puff 2 (Two) Times a Day.  Dispense: 60 each; Refill: 1      Pt to return for paperwork when labs back

## 2020-10-18 PROBLEM — IMO0002: Status: RESOLVED | Noted: 2018-08-14 | Resolved: 2020-10-18

## 2020-10-18 PROBLEM — Z87.440 HX OF RECURRENT URINARY TRACT INFECTION: Status: RESOLVED | Noted: 2017-02-08 | Resolved: 2020-10-18

## 2020-10-18 PROBLEM — G89.29 CHRONIC HEADACHE: Status: RESOLVED | Noted: 2017-11-29 | Resolved: 2020-10-18

## 2020-10-18 PROBLEM — S40.819S: Status: RESOLVED | Noted: 2018-08-14 | Resolved: 2020-10-18

## 2020-10-18 PROBLEM — R51.9 CHRONIC HEADACHE: Status: RESOLVED | Noted: 2017-11-29 | Resolved: 2020-10-18

## 2020-10-20 ENCOUNTER — CLINICAL SUPPORT (OUTPATIENT)
Dept: FAMILY MEDICINE CLINIC | Facility: CLINIC | Age: 25
End: 2020-10-20

## 2020-10-20 DIAGNOSIS — Z00.00 WELLNESS EXAMINATION: Primary | ICD-10-CM

## 2020-10-20 PROCEDURE — 36415 COLL VENOUS BLD VENIPUNCTURE: CPT | Performed by: FAMILY MEDICINE

## 2020-10-20 PROCEDURE — 86481 TB AG RESPONSE T-CELL SUSP: CPT | Performed by: FAMILY MEDICINE

## 2020-10-22 LAB
TSPOT INTERPRETATION: NEGATIVE
TSPOT NIL CONTROL INTERPRETATION: NORMAL
TSPOT PANEL A: 0
TSPOT PANEL B: 0
TSPOT POS CONTROL INTERPRETATION: NORMAL

## 2020-12-02 PROCEDURE — U0003 INFECTIOUS AGENT DETECTION BY NUCLEIC ACID (DNA OR RNA); SEVERE ACUTE RESPIRATORY SYNDROME CORONAVIRUS 2 (SARS-COV-2) (CORONAVIRUS DISEASE [COVID-19]), AMPLIFIED PROBE TECHNIQUE, MAKING USE OF HIGH THROUGHPUT TECHNOLOGIES AS DESCRIBED BY CMS-2020-01-R: HCPCS | Performed by: NURSE PRACTITIONER

## 2021-02-02 ENCOUNTER — OFFICE VISIT (OUTPATIENT)
Dept: FAMILY MEDICINE CLINIC | Facility: CLINIC | Age: 26
End: 2021-02-02

## 2021-02-02 VITALS
BODY MASS INDEX: 22.38 KG/M2 | SYSTOLIC BLOOD PRESSURE: 92 MMHG | HEIGHT: 60 IN | HEART RATE: 95 BPM | OXYGEN SATURATION: 100 % | RESPIRATION RATE: 16 BRPM | DIASTOLIC BLOOD PRESSURE: 68 MMHG | TEMPERATURE: 97.8 F | WEIGHT: 114 LBS

## 2021-02-02 DIAGNOSIS — G43.109 MIGRAINE WITH AURA AND WITHOUT STATUS MIGRAINOSUS, NOT INTRACTABLE: Primary | ICD-10-CM

## 2021-02-02 DIAGNOSIS — F51.02 ADJUSTMENT INSOMNIA: ICD-10-CM

## 2021-02-02 PROCEDURE — 99213 OFFICE O/P EST LOW 20 MIN: CPT | Performed by: FAMILY MEDICINE

## 2021-02-02 RX ORDER — DULOXETIN HYDROCHLORIDE 20 MG/1
20 CAPSULE, DELAYED RELEASE ORAL DAILY
Qty: 30 CAPSULE | Refills: 0 | Status: SHIPPED | OUTPATIENT
Start: 2021-02-02 | End: 2021-02-10

## 2021-02-02 RX ORDER — TOPIRAMATE 25 MG/1
25 TABLET ORAL 2 TIMES DAILY
Qty: 60 TABLET | Refills: 1 | Status: SHIPPED | OUTPATIENT
Start: 2021-02-02 | End: 2021-08-19

## 2021-02-02 RX ORDER — TRAZODONE HYDROCHLORIDE 50 MG/1
TABLET ORAL
Qty: 30 TABLET | Refills: 0 | Status: SHIPPED | OUTPATIENT
Start: 2021-02-02 | End: 2021-08-19

## 2021-02-02 RX ORDER — ACETAMINOPHEN, ASPIRIN AND CAFFEINE 250; 250; 65 MG/1; MG/1; MG/1
TABLET, FILM COATED ORAL
COMMUNITY

## 2021-02-08 NOTE — PROGRESS NOTES
Subjective   Argenis Decker is a 25 y.o. female.     Chief Complaint   Patient presents with   • Migraine         Current Outpatient Medications:   •  albuterol sulfate HFA (PROAIR HFA) 108 (90 Base) MCG/ACT inhaler, PROAIR  (90 Base) MCG/ACT AERS, Disp: , Rfl:   •  aspirin-acetaminophen-caffeine (EXCEDRIN MIGRAINE) 250-250-65 MG per tablet, PRN, Disp: , Rfl:   •  dihydroergotamine (MIGRANAL) 4 MG/ML nasal spray, 1 spray into the nostril(s) as directed by provider As Needed for Migraine. Spray in each nostril prh MIGRAINE and may repeat x 1 in 15min, Disp: 1 each, Rfl: 1  •  fluticasone-salmeterol (ADVAIR) 100-50 MCG/DOSE DISKUS, Inhale 1 puff 2 (Two) Times a Day., Disp: 60 each, Rfl: 1  •  hydrOXYzine pamoate (Vistaril) 25 MG capsule, Take 1 capsule by mouth 3 (Three) Times a Day As Needed for Anxiety., Disp: 20 capsule, Rfl: 0  •  ibuprofen (ADVIL,MOTRIN) 800 MG tablet, TAKE 1 TABLET BY MOUTH EVERY SIX HOURS AS NEEDED FOR mild PAIN, Disp: 120 tablet, Rfl: 0  •  meclizine (ANTIVERT) 25 MG tablet, Take 1 tablet by mouth 3 (Three) Times a Day As Needed for Dizziness., Disp: 90 tablet, Rfl: 0  •  methocarbamol (Robaxin) 500 MG tablet, Take 1 tablet by mouth 2 (Two) Times a Day As Needed for Muscle Spasms., Disp: 30 tablet, Rfl: 1  •  Multiple Vitamins-Minerals (MULTIVITAMIN WITH MINERALS) tablet tablet, Take 1 tablet by mouth Daily., Disp: , Rfl:   •  ondansetron (Zofran) 4 MG tablet, Take 1 tablet by mouth Every 8 (Eight) Hours As Needed for Nausea or Vomiting., Disp: 30 tablet, Rfl: 1  •  promethazine (PHENERGAN) 12.5 MG tablet, Take 1 tablet by mouth Every 6 (Six) Hours As Needed for Nausea or Vomiting., Disp: 40 tablet, Rfl: 0  •  spironolactone (ALDACTONE) 50 MG tablet, Take 1/2 tablet by mouth in the morning and 1/2 tablet at noon., Disp: , Rfl:   •  topiramate (TOPAMAX) 25 MG tablet, Take 1 tablet by mouth 2 (Two) Times a Day., Disp: 60 tablet, Rfl: 1  •  DULoxetine (CYMBALTA) 20 MG capsule, Take 1  capsule by mouth Daily., Disp: 30 capsule, Rfl: 0  •  traZODone (DESYREL) 50 MG tablet, 1- 3 tabs po qhs prn insomnia, Disp: 30 tablet, Rfl: 0    Past Medical History:   Diagnosis Date   • Angioedema    • Anxiety    • Asthma    • Headache    • Neck pain    • Panic attacks    • Schwannoma    • Seasonal depression    • Visual impairment     computer/ studying only       Past Surgical History:   Procedure Laterality Date   • BREAST AUGMENTATION Bilateral 10/08/2015    Silicone   • EAR TUBES  1997   • GAMMA KNIFE RESECTION OF SCHWANNOMA  02/20/2017     tumor from post c-spine   • WISDOM TOOTH EXTRACTION  2013       Family History   Problem Relation Age of Onset   • Hypertension Mother    • Cancer Mother         BCC   • Hypertension Father    • Other Father         GLAUCOMA   • Heart disease Maternal Grandmother    • Cancer Maternal Grandfather         Skin Cancer   • Cancer Paternal Grandmother         Skin, Breast and Lung Cancer   • Heart disease Paternal Grandfather    • Heart disease Other         Grandparents   • Asthma Sister    • Other Sister         ADD   • Raynaud syndrome Sister        Social History     Socioeconomic History   • Marital status: Single     Spouse name: Not on file   • Number of children: Not on file   • Years of education: Not on file   • Highest education level: Not on file   Tobacco Use   • Smoking status: Never Smoker   • Smokeless tobacco: Never Used   • Tobacco comment: Passive Smoke: N   Substance and Sexual Activity   • Alcohol use: Yes     Alcohol/week: 1.0 standard drinks     Types: 1 Cans of beer per week     Frequency: Never   • Drug use: No   • Sexual activity: Defer       26 y/o C female here w/ c/o's worsening HA's    Pt states she has been using the Topamax/ migranal and otc Excedrin w/o success       The following portions of the patient's history were reviewed and updated as appropriate: allergies, current medications, past family history, past medical history, past social  history, past surgical history and problem list.    Review of Systems   Constitutional: Negative for activity change, appetite change, fatigue, unexpected weight gain and unexpected weight loss.   Eyes: Positive for photophobia.   Gastrointestinal: Negative for nausea and vomiting.   Neurological: Positive for headache. Negative for syncope, facial asymmetry, speech difficulty, light-headedness and numbness.   Psychiatric/Behavioral: Negative for sleep disturbance.       Vitals:    02/02/21 1606   BP: 92/68   Pulse: 95   Resp: 16   Temp: 97.8 °F (36.6 °C)   SpO2: 100%       Objective   Physical Exam  Vitals signs and nursing note reviewed.   Constitutional:       General: She is not in acute distress.     Appearance: Normal appearance. She is normal weight. She is not ill-appearing or toxic-appearing.   HENT:      Head: Normocephalic and atraumatic.   Neurological:      Mental Status: She is alert and oriented to person, place, and time.      Cranial Nerves: No cranial nerve deficit.   Psychiatric:         Mood and Affect: Mood normal.         Behavior: Behavior normal.         Thought Content: Thought content normal.         Judgment: Judgment normal.           Assessment/Plan   Diagnoses and all orders for this visit:    1. Migraine with aura and without status migrainosus, not intractable (Primary)  -     Ambulatory Referral to Neurology  -     topiramate (TOPAMAX) 25 MG tablet; Take 1 tablet by mouth 2 (Two) Times a Day.  Dispense: 60 tablet; Refill: 1    2. Adjustment insomnia    Other orders  -     DULoxetine (CYMBALTA) 20 MG capsule; Take 1 capsule by mouth Daily.  Dispense: 30 capsule; Refill: 0  -     traZODone (DESYREL) 50 MG tablet; 1- 3 tabs po qhs prn insomnia  Dispense: 30 tablet; Refill: 0      Referral to Neuro and try low dose bid Topamax  Trial of Trazodone for sleep

## 2021-02-09 ENCOUNTER — TELEPHONE (OUTPATIENT)
Dept: FAMILY MEDICINE CLINIC | Facility: CLINIC | Age: 26
End: 2021-02-09

## 2021-02-09 NOTE — TELEPHONE ENCOUNTER
Caller: Decker Argenis R    Relationship: Self    Best call back number: [phone]    What medications are you currently taking:   Current Outpatient Medications on File Prior to Visit   Medication Sig Dispense Refill   • albuterol sulfate HFA (PROAIR HFA) 108 (90 Base) MCG/ACT inhaler PROAIR  (90 Base) MCG/ACT AERS     • aspirin-acetaminophen-caffeine (EXCEDRIN MIGRAINE) 250-250-65 MG per tablet PRN     • dihydroergotamine (MIGRANAL) 4 MG/ML nasal spray 1 spray into the nostril(s) as directed by provider As Needed for Migraine. Spray in each nostril prh MIGRAINE and may repeat x 1 in 15min 1 each 1   • DULoxetine (CYMBALTA) 20 MG capsule Take 1 capsule by mouth Daily. 30 capsule 0   • fluticasone-salmeterol (ADVAIR) 100-50 MCG/DOSE DISKUS Inhale 1 puff 2 (Two) Times a Day. 60 each 1   • hydrOXYzine pamoate (Vistaril) 25 MG capsule Take 1 capsule by mouth 3 (Three) Times a Day As Needed for Anxiety. 20 capsule 0   • ibuprofen (ADVIL,MOTRIN) 800 MG tablet TAKE 1 TABLET BY MOUTH EVERY SIX HOURS AS NEEDED FOR mild PAIN 120 tablet 0   • meclizine (ANTIVERT) 25 MG tablet Take 1 tablet by mouth 3 (Three) Times a Day As Needed for Dizziness. 90 tablet 0   • methocarbamol (Robaxin) 500 MG tablet Take 1 tablet by mouth 2 (Two) Times a Day As Needed for Muscle Spasms. 30 tablet 1   • Multiple Vitamins-Minerals (MULTIVITAMIN WITH MINERALS) tablet tablet Take 1 tablet by mouth Daily.     • ondansetron (Zofran) 4 MG tablet Take 1 tablet by mouth Every 8 (Eight) Hours As Needed for Nausea or Vomiting. 30 tablet 1   • promethazine (PHENERGAN) 12.5 MG tablet Take 1 tablet by mouth Every 6 (Six) Hours As Needed for Nausea or Vomiting. 40 tablet 0   • spironolactone (ALDACTONE) 50 MG tablet Take 1/2 tablet by mouth in the morning and 1/2 tablet at noon.     • topiramate (TOPAMAX) 25 MG tablet Take 1 tablet by mouth 2 (Two) Times a Day. 60 tablet 1   • traZODone (DESYREL) 50 MG tablet 1- 3 tabs po qhs prn insomnia 30  tablet 0     No current facility-administered medications on file prior to visit.        Which medication are you concerned about: DULoxetine (CYMBALTA) 20 MG capsule    Who prescribed you this medication: DR SOTO    What are your concerns: PATIENT TOOK NEW MEDS BUT SHE COULD NOT SLEEP FOR 72 HOURS, SO SHE STOPPED MEDICATION. PATIENT HAS HAD MIGRAINE FOR THREE DAYS, PLEASE CALL TO ADVISE ON NEXT STEPS.

## 2021-02-10 PROCEDURE — U0003 INFECTIOUS AGENT DETECTION BY NUCLEIC ACID (DNA OR RNA); SEVERE ACUTE RESPIRATORY SYNDROME CORONAVIRUS 2 (SARS-COV-2) (CORONAVIRUS DISEASE [COVID-19]), AMPLIFIED PROBE TECHNIQUE, MAKING USE OF HIGH THROUGHPUT TECHNOLOGIES AS DESCRIBED BY CMS-2020-01-R: HCPCS | Performed by: FAMILY MEDICINE

## 2021-02-10 NOTE — TELEPHONE ENCOUNTER
I see she went to [facility] today----does he think her HA is due to COVID? That usu needs a dif med   I tried to see what they did but couldn't read the note yet  Did she try the trazodone for sleep???

## 2021-02-11 NOTE — TELEPHONE ENCOUNTER
"She feels her HA was more migraine related, and She was not given anything for her it, but she it is much better now.  She alternated IB and tylenol.  Trazodone is helping her sleep again, she was really just wanting to make sure you knew that she stopped her Cymbalta (because it made her feel like a \"crazy person\"), and thinks this is why she wasn't sleeping/having headaches.  But does not need anything right now.   "

## 2021-04-16 ENCOUNTER — TELEPHONE (OUTPATIENT)
Dept: FAMILY MEDICINE CLINIC | Facility: CLINIC | Age: 26
End: 2021-04-16

## 2021-04-16 NOTE — TELEPHONE ENCOUNTER
PATIENT STATES SHE HAS REGULAR BOWEL MOVEMENTS BUT WHEN SHE WIPES THERE IS BLOOD. SHE STATES IT IS PAINFUL AND WOULD LIKE TO KNOW IF SHE NEEDS A STOOL SOFTENER.    PLEASE ADVISE: [phone]

## 2021-07-21 ENCOUNTER — TELEPHONE (OUTPATIENT)
Dept: FAMILY MEDICINE CLINIC | Facility: CLINIC | Age: 26
End: 2021-07-21

## 2021-07-21 RX ORDER — HYDROXYZINE PAMOATE 25 MG/1
25 CAPSULE ORAL 3 TIMES DAILY PRN
Qty: 20 CAPSULE | Refills: 0 | Status: SHIPPED | OUTPATIENT
Start: 2021-07-21 | End: 2021-08-19 | Stop reason: SDUPTHER

## 2021-07-21 NOTE — TELEPHONE ENCOUNTER
PATIENT IS CALLING IN SHE STATES SHE IS HAVING A LOT OF ANXIETY BECAUSE FINALS ARE COMING AND SHE IS STRESSED OUT. SHE IS WANTING TO KNOW IF DOCTOR CAN PRESCRIBE SOMETHING FOR HER. TRIED TO MAKE APPT NOTHING AVAILABLE TILL END OF NEXT WEEK.      PLEASE ADVISE    CALLBACK NUMBER IS  [phone]       CONFIRMED PHARMACY  [id number] - [place], [place] - [address] - [phone]  - [phone] FX

## 2021-08-09 ENCOUNTER — TELEPHONE (OUTPATIENT)
Dept: FAMILY MEDICINE CLINIC | Facility: CLINIC | Age: 26
End: 2021-08-09

## 2021-08-09 NOTE — TELEPHONE ENCOUNTER
PATIENT IS NEEDING RECORD THAT REFUSED VACCINES IN THE PAST AND RECORD OF ANY TITERS SHE HAS HAD DRAWN.    SHE NEEDS TODAY AND HER MOM-SANTA WILL PICK THEM UP    PLEASE ADVISE  [phone]

## 2021-08-09 NOTE — TELEPHONE ENCOUNTER
Caller: Argenis Decker    Relationship: Self    Best call back number: [phone]*    What is the best time to reach you: ANYTIME    Who are you requesting to speak with (clinical staff, provider,  specific staff member): CLINICAL STAFF MEMBER    What was the call regarding: PATIENT CALLING WANTING TO KNOW IF SHE HAD A FLU VACCINE IN 2018.     Do you require a callback: YES

## 2021-08-09 NOTE — TELEPHONE ENCOUNTER
Sp w/pt.  We do not have on file any refusals of vaccinations.  The only Titer we have is Tspot.  Printed copy for pt.  Also printed copy of paperwork that was completed for [facility] school, and vaccine records.  Pt gives verbal permission for mom,Lucia, to pick them up.

## 2021-08-19 ENCOUNTER — OFFICE VISIT (OUTPATIENT)
Dept: FAMILY MEDICINE CLINIC | Facility: CLINIC | Age: 26
End: 2021-08-19

## 2021-08-19 VITALS
TEMPERATURE: 97.1 F | DIASTOLIC BLOOD PRESSURE: 68 MMHG | HEART RATE: 78 BPM | HEIGHT: 60 IN | OXYGEN SATURATION: 100 % | BODY MASS INDEX: 21.01 KG/M2 | RESPIRATION RATE: 16 BRPM | WEIGHT: 107 LBS | SYSTOLIC BLOOD PRESSURE: 103 MMHG

## 2021-08-19 DIAGNOSIS — F41.9 ANXIETY: Primary | ICD-10-CM

## 2021-08-19 DIAGNOSIS — F51.02 ADJUSTMENT INSOMNIA: ICD-10-CM

## 2021-08-19 DIAGNOSIS — F41.0 PANIC ATTACKS: ICD-10-CM

## 2021-08-19 DIAGNOSIS — G43.109 MIGRAINE WITH AURA AND WITHOUT STATUS MIGRAINOSUS, NOT INTRACTABLE: ICD-10-CM

## 2021-08-19 PROBLEM — G43.709 CHRONIC MIGRAINE WITHOUT AURA WITHOUT STATUS MIGRAINOSUS, NOT INTRACTABLE: Status: ACTIVE | Noted: 2021-03-22

## 2021-08-19 PROCEDURE — 84443 ASSAY THYROID STIM HORMONE: CPT | Performed by: FAMILY MEDICINE

## 2021-08-19 PROCEDURE — 85025 COMPLETE CBC W/AUTO DIFF WBC: CPT | Performed by: FAMILY MEDICINE

## 2021-08-19 PROCEDURE — 80053 COMPREHEN METABOLIC PANEL: CPT | Performed by: FAMILY MEDICINE

## 2021-08-19 PROCEDURE — 84439 ASSAY OF FREE THYROXINE: CPT | Performed by: FAMILY MEDICINE

## 2021-08-19 PROCEDURE — 99214 OFFICE O/P EST MOD 30 MIN: CPT | Performed by: FAMILY MEDICINE

## 2021-08-19 RX ORDER — ONDANSETRON 4 MG/1
4 TABLET, FILM COATED ORAL EVERY 8 HOURS PRN
Qty: 30 TABLET | Refills: 1 | Status: SHIPPED | OUTPATIENT
Start: 2021-08-19

## 2021-08-19 RX ORDER — METHOCARBAMOL 500 MG/1
500 TABLET, FILM COATED ORAL 2 TIMES DAILY PRN
Qty: 30 TABLET | Refills: 1 | Status: SHIPPED | OUTPATIENT
Start: 2021-08-19 | End: 2022-08-23

## 2021-08-19 RX ORDER — HYDROXYZINE PAMOATE 25 MG/1
25 CAPSULE ORAL 3 TIMES DAILY PRN
Qty: 40 CAPSULE | Refills: 0 | Status: SHIPPED | OUTPATIENT
Start: 2021-08-19

## 2021-08-19 RX ORDER — TOPIRAMATE 50 MG/1
50 TABLET, FILM COATED ORAL NIGHTLY PRN
COMMUNITY
End: 2021-08-19

## 2021-08-19 RX ORDER — FREMANEZUMAB-VFRM 225 MG/1.5ML
INJECTION SUBCUTANEOUS
COMMUNITY
Start: 2021-08-09

## 2021-08-19 RX ORDER — TRAZODONE HYDROCHLORIDE 100 MG/1
100 TABLET ORAL NIGHTLY PRN
Qty: 30 TABLET | Refills: 1 | Status: SHIPPED | OUTPATIENT
Start: 2021-08-19

## 2021-08-19 RX ORDER — SERTRALINE HYDROCHLORIDE 25 MG/1
25 TABLET, FILM COATED ORAL NIGHTLY
Qty: 30 TABLET | Refills: 0 | Status: SHIPPED | OUTPATIENT
Start: 2021-08-19 | End: 2021-09-23 | Stop reason: SDUPTHER

## 2021-08-19 RX ORDER — METAXALONE 800 MG/1
1 TABLET ORAL 3 TIMES DAILY
COMMUNITY
End: 2021-08-19

## 2021-08-19 RX ORDER — TOPIRAMATE 50 MG/1
50 TABLET, FILM COATED ORAL NIGHTLY
Status: SHIPPED
Start: 2021-08-19 | End: 2022-05-31

## 2021-08-19 NOTE — PROGRESS NOTES
Subjective   Argenis Decker is a 25 y.o. female.     Chief Complaint   Patient presents with   • Anxiety         Current Outpatient Medications:   •  albuterol sulfate HFA (PROAIR HFA) 108 (90 Base) MCG/ACT inhaler, PROAIR  (90 Base) MCG/ACT AERS, Disp: , Rfl:   •  aspirin-acetaminophen-caffeine (EXCEDRIN MIGRAINE) 250-250-65 MG per tablet, PRN, Disp: , Rfl:   •  fluticasone-salmeterol (ADVAIR) 100-50 MCG/DOSE DISKUS, Inhale 1 puff 2 (Two) Times a Day., Disp: 60 each, Rfl: 1  •  Fremanezumab-vfrm (Ajovy) 225 MG/1.5ML solution auto-injector, Inject 1.5 mLs into the skin every 30 (thirty) days., Disp: , Rfl:   •  hydrOXYzine pamoate (Vistaril) 25 MG capsule, Take 1 capsule by mouth 3 (Three) Times a Day As Needed for Anxiety., Disp: 40 capsule, Rfl: 0  •  ibuprofen (ADVIL,MOTRIN) 800 MG tablet, TAKE 1 TABLET BY MOUTH EVERY SIX HOURS AS NEEDED FOR mild PAIN, Disp: 120 tablet, Rfl: 0  •  levonorgestrel (KYLEENA) 19.5 MG intrauterine device IUD, 1 each by Intrauterine route 1 (One) Time., Disp: , Rfl:   •  meclizine (ANTIVERT) 25 MG tablet, Take 1 tablet by mouth 3 (Three) Times a Day As Needed for Dizziness., Disp: 90 tablet, Rfl: 0  •  methocarbamol (Robaxin) 500 MG tablet, Take 1 tablet by mouth 2 (Two) Times a Day As Needed for Muscle Spasms., Disp: 30 tablet, Rfl: 1  •  Multiple Vitamins-Minerals (MULTIVITAMIN WITH MINERALS) tablet tablet, Take 1 tablet by mouth Daily., Disp: , Rfl:   •  ondansetron (Zofran) 4 MG tablet, Take 1 tablet by mouth Every 8 (Eight) Hours As Needed for Nausea or Vomiting., Disp: 30 tablet, Rfl: 1  •  promethazine (PHENERGAN) 12.5 MG tablet, Take 1 tablet by mouth Every 6 (Six) Hours As Needed for Nausea or Vomiting., Disp: 40 tablet, Rfl: 0  •  topiramate (TOPAMAX) 50 MG tablet, Take 1 tablet by mouth Every Night., Disp: , Rfl:   •  traZODone (DESYREL) 100 MG tablet, Take 1 tablet by mouth At Night As Needed for Sleep., Disp: 30 tablet, Rfl: 1  •  sertraline (ZOLOFT) 25 MG tablet,  Take 1 tablet by mouth Every Night., Disp: 30 tablet, Rfl: 0    Past Medical History:   Diagnosis Date   • Angioedema    • Anxiety    • Asthma    • Headache    • Neck pain    • Panic attacks    • Schwannoma    • Seasonal depression    • Visual impairment     computer/ studying only       Past Surgical History:   Procedure Laterality Date   • BREAST AUGMENTATION Bilateral 10/08/2015    Silicone   • EAR TUBES  1997   • GAMMA KNIFE RESECTION OF SCHWANNOMA  02/20/2017     tumor from post c-spine   • WISDOM TOOTH EXTRACTION  2013       Family History   Problem Relation Age of Onset   • Hypertension Mother    • Cancer Mother         BCC   • Hypertension Father    • Other Father         GLAUCOMA   • Heart disease Maternal Grandmother    • Cancer Maternal Grandfather         Skin Cancer   • Cancer Paternal Grandmother         Skin, Breast and Lung Cancer   • Heart disease Paternal Grandfather    • Heart disease Other         Grandparents   • Asthma Sister    • Other Sister         ADD   • Raynaud syndrome Sister        Social History     Socioeconomic History   • Marital status: Single     Spouse name: Not on file   • Number of children: Not on file   • Years of education: Not on file   • Highest education level: Not on file   Tobacco Use   • Smoking status: Never Smoker   • Smokeless tobacco: Never Used   • Tobacco comment: Passive Smoke: N   Vaping Use   • Vaping Use: Never used   Substance and Sexual Activity   • Alcohol use: Yes     Alcohol/week: 1.0 standard drinks     Types: 1 Cans of beer per week   • Drug use: No   • Sexual activity: Defer       24 y/o C female here w/ c/o's Anxiety      Pt states she had a panic attack while in [place]------->>>she had heart palpitations/ SOB/ crying w/ no known trigger; 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    Review of Systems   Constitutional: Positive for appetite change and unexpected weight loss. Negative for activity change and unexpected weight gain.   Gastrointestinal: Negative for constipation, diarrhea, nausea and vomiting.   Psychiatric/Behavioral: Positive for sleep disturbance and stress. Negative for dysphoric mood and depressed mood. The patient is nervous/anxious.        Vitals:    08/19/21 0817   BP: 103/68   Pulse: 78   Resp: 16   Temp: 97.1 °F (36.2 °C)   SpO2: 100%       Objective   Physical Exam  Vitals and nursing note reviewed.   Constitutional:       General: She is in acute distress.      Appearance: She is well-developed. She is not ill-appearing, toxic-appearing or diaphoretic.   HENT:      Head: Normocephalic and atraumatic.   Cardiovascular:      Rate and Rhythm: Normal rate and regular rhythm.      Heart sounds: Normal heart sounds. No murmur heard.     Pulmonary:      Effort: Pulmonary effort is normal.      Breath sounds: Normal breath sounds.   Musculoskeletal:      Cervical back: Normal range of motion and neck supple.   Skin:     General: Skin is warm and dry.      Findings: No rash.   Neurological:      Mental Status: She is alert and oriented to person, place, and time.      Cranial Nerves: No cranial nerve deficit.   Psychiatric:         Attention and Perception: Attention and perception normal.         Mood and Affect: Mood is anxious.         Speech: Speech normal.         Behavior: Behavior normal. Behavior is cooperative.         Thought Content: Thought content normal.         Cognition and Memory: Cognition and memory normal.         Judgment: Judgment  normal.           Assessment/Plan   Diagnoses and all orders for this visit:    1. Anxiety (Primary)  -     TSH  -     T4, Free  -     Comprehensive Metabolic Panel  -     CBC & Differential    2. Panic attacks    3. Adjustment insomnia    4. Migraine with aura and without status migrainosus, not intractable    Other orders  -     topiramate (TOPAMAX) 50 MG tablet; Take 1 tablet by mouth Every Night.  -     methocarbamol (Robaxin) 500 MG tablet; Take 1 tablet by mouth 2 (Two) Times a Day As Needed for Muscle Spasms.  Dispense: 30 tablet; Refill: 1  -     hydrOXYzine pamoate (Vistaril) 25 MG capsule; Take 1 capsule by mouth 3 (Three) Times a Day As Needed for Anxiety.  Dispense: 40 capsule; Refill: 0  -     ondansetron (Zofran) 4 MG tablet; Take 1 tablet by mouth Every 8 (Eight) Hours As Needed for Nausea or Vomiting.  Dispense: 30 tablet; Refill: 1  -     traZODone (DESYREL) 100 MG tablet; Take 1 tablet by mouth At Night As Needed for Sleep.  Dispense: 30 tablet; Refill: 1  -     sertraline (ZOLOFT) 25 MG tablet; Take 1 tablet by mouth Every Night.  Dispense: 30 tablet; Refill: 0    labs today  Trial of low dose Zoloft and prn vistaril  Prn Trazodone for sleep

## 2021-08-20 LAB
ALBUMIN SERPL-MCNC: 4.3 G/DL (ref 3.5–5.2)
ALBUMIN/GLOB SERPL: 1.4 G/DL
ALP SERPL-CCNC: 77 U/L (ref 39–117)
ALT SERPL W P-5'-P-CCNC: 8 U/L (ref 1–33)
ANION GAP SERPL CALCULATED.3IONS-SCNC: 8.9 MMOL/L (ref 5–15)
AST SERPL-CCNC: 16 U/L (ref 1–32)
BASOPHILS # BLD AUTO: 0.07 10*3/MM3 (ref 0–0.2)
BASOPHILS NFR BLD AUTO: 1.2 % (ref 0–1.5)
BILIRUB SERPL-MCNC: 1 MG/DL (ref 0–1.2)
BUN SERPL-MCNC: 6 MG/DL (ref 6–20)
BUN/CREAT SERPL: 6.7 (ref 7–25)
CALCIUM SPEC-SCNC: 9.1 MG/DL (ref 8.6–10.5)
CHLORIDE SERPL-SCNC: 108 MMOL/L (ref 98–107)
CO2 SERPL-SCNC: 25.1 MMOL/L (ref 22–29)
CREAT SERPL-MCNC: 0.89 MG/DL (ref 0.57–1)
DEPRECATED RDW RBC AUTO: 41.9 FL (ref 37–54)
EOSINOPHIL # BLD AUTO: 0.11 10*3/MM3 (ref 0–0.4)
EOSINOPHIL NFR BLD AUTO: 1.8 % (ref 0.3–6.2)
ERYTHROCYTE [DISTWIDTH] IN BLOOD BY AUTOMATED COUNT: 12.8 % (ref 12.3–15.4)
GFR SERPL CREATININE-BSD FRML MDRD: 78 ML/MIN/1.73
GLOBULIN UR ELPH-MCNC: 3.1 GM/DL
GLUCOSE SERPL-MCNC: 66 MG/DL (ref 65–99)
HCT VFR BLD AUTO: 38.4 % (ref 34–46.6)
HGB BLD-MCNC: 13.1 G/DL (ref 12–15.9)
IMM GRANULOCYTES # BLD AUTO: 0.03 10*3/MM3 (ref 0–0.05)
IMM GRANULOCYTES NFR BLD AUTO: 0.5 % (ref 0–0.5)
LYMPHOCYTES # BLD AUTO: 2.31 10*3/MM3 (ref 0.7–3.1)
LYMPHOCYTES NFR BLD AUTO: 38.8 % (ref 19.6–45.3)
MCH RBC QN AUTO: 30.2 PG (ref 26.6–33)
MCHC RBC AUTO-ENTMCNC: 34.1 G/DL (ref 31.5–35.7)
MCV RBC AUTO: 88.5 FL (ref 79–97)
MONOCYTES # BLD AUTO: 0.49 10*3/MM3 (ref 0.1–0.9)
MONOCYTES NFR BLD AUTO: 8.2 % (ref 5–12)
NEUTROPHILS NFR BLD AUTO: 2.94 10*3/MM3 (ref 1.7–7)
NEUTROPHILS NFR BLD AUTO: 49.5 % (ref 42.7–76)
NRBC BLD AUTO-RTO: 0 /100 WBC (ref 0–0.2)
PLATELET # BLD AUTO: 285 10*3/MM3 (ref 140–450)
PMV BLD AUTO: 11.5 FL (ref 6–12)
POTASSIUM SERPL-SCNC: 4 MMOL/L (ref 3.5–5.2)
PROT SERPL-MCNC: 7.4 G/DL (ref 6–8.5)
RBC # BLD AUTO: 4.34 10*6/MM3 (ref 3.77–5.28)
SODIUM SERPL-SCNC: 142 MMOL/L (ref 136–145)
T4 FREE SERPL-MCNC: 1.24 NG/DL (ref 0.93–1.7)
TSH SERPL DL<=0.05 MIU/L-ACNC: 0.92 UIU/ML (ref 0.27–4.2)
WBC # BLD AUTO: 5.95 10*3/MM3 (ref 3.4–10.8)

## 2021-09-02 ENCOUNTER — TELEPHONE (OUTPATIENT)
Dept: FAMILY MEDICINE CLINIC | Facility: CLINIC | Age: 26
End: 2021-09-02

## 2021-09-02 NOTE — TELEPHONE ENCOUNTER
Pt LVM today stating that she got her #2 Pfizer immunization today and now has fever, feels like her BP is dropping, vomited today. 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 Pt been taking Nsaids and wants to know if there is anything else she should take? Says she feels horrible. Please advise and/or call pt

## 2021-09-23 ENCOUNTER — OFFICE VISIT (OUTPATIENT)
Dept: FAMILY MEDICINE CLINIC | Facility: CLINIC | Age: 26
End: 2021-09-23

## 2021-09-23 VITALS
WEIGHT: 107 LBS | OXYGEN SATURATION: 99 % | TEMPERATURE: 98.4 F | DIASTOLIC BLOOD PRESSURE: 60 MMHG | HEART RATE: 89 BPM | RESPIRATION RATE: 16 BRPM | HEIGHT: 60 IN | SYSTOLIC BLOOD PRESSURE: 87 MMHG | BODY MASS INDEX: 21.01 KG/M2

## 2021-09-23 DIAGNOSIS — F41.9 ANXIETY: Primary | ICD-10-CM

## 2021-09-23 DIAGNOSIS — F51.02 ADJUSTMENT INSOMNIA: ICD-10-CM

## 2021-09-23 PROCEDURE — 99213 OFFICE O/P EST LOW 20 MIN: CPT | Performed by: FAMILY MEDICINE

## 2021-09-23 RX ORDER — SERTRALINE HYDROCHLORIDE 25 MG/1
25 TABLET, FILM COATED ORAL NIGHTLY
Qty: 30 TABLET | Refills: 2 | Status: SHIPPED | OUTPATIENT
Start: 2021-09-23 | End: 2022-05-31

## 2021-10-07 ENCOUNTER — OFFICE VISIT (OUTPATIENT)
Dept: FAMILY MEDICINE CLINIC | Facility: CLINIC | Age: 26
End: 2021-10-07

## 2021-10-07 VITALS
SYSTOLIC BLOOD PRESSURE: 96 MMHG | OXYGEN SATURATION: 100 % | BODY MASS INDEX: 21.2 KG/M2 | WEIGHT: 108 LBS | HEIGHT: 60 IN | TEMPERATURE: 96.6 F | DIASTOLIC BLOOD PRESSURE: 60 MMHG | RESPIRATION RATE: 12 BRPM | HEART RATE: 73 BPM

## 2021-10-07 DIAGNOSIS — J35.8 TONSILLITH: Primary | ICD-10-CM

## 2021-10-07 DIAGNOSIS — Z11.1 TUBERCULOSIS SCREENING: ICD-10-CM

## 2021-10-07 PROCEDURE — 99213 OFFICE O/P EST LOW 20 MIN: CPT | Performed by: FAMILY MEDICINE

## 2021-10-07 PROCEDURE — 86481 TB AG RESPONSE T-CELL SUSP: CPT | Performed by: FAMILY MEDICINE

## 2021-10-07 RX ORDER — CHLORHEXIDINE GLUCONATE 0.12 MG/ML
15 RINSE ORAL 2 TIMES DAILY
Qty: 473 ML | Refills: 0 | Status: SHIPPED | OUTPATIENT
Start: 2021-10-07

## 2021-10-09 LAB
TSPOT INTERPRETATION: NEGATIVE
TSPOT NIL CONTROL INTERPRETATION: NORMAL
TSPOT PANEL A: 0
TSPOT PANEL B: 1
TSPOT POS CONTROL INTERPRETATION: NORMAL

## 2022-02-08 ENCOUNTER — TELEPHONE (OUTPATIENT)
Dept: FAMILY MEDICINE CLINIC | Facility: CLINIC | Age: 27
End: 2022-02-08

## 2022-02-08 RX ORDER — PROCHLORPERAZINE MALEATE 10 MG
10 TABLET ORAL EVERY 6 HOURS PRN
Qty: 20 TABLET | Refills: 0 | Status: SHIPPED | OUTPATIENT
Start: 2022-02-08

## 2022-02-08 RX ORDER — KETOROLAC TROMETHAMINE 10 MG/1
10-20 TABLET, FILM COATED ORAL 2 TIMES DAILY PRN
Qty: 20 TABLET | Refills: 0 | Status: SHIPPED | OUTPATIENT
Start: 2022-02-08 | End: 2022-09-16

## 2022-02-08 RX ORDER — METHYLPREDNISOLONE 4 MG/1
TABLET ORAL
Qty: 21 TABLET | Refills: 0 | Status: SHIPPED | OUTPATIENT
Start: 2022-02-08 | End: 2022-04-13

## 2022-02-08 NOTE — TELEPHONE ENCOUNTER
Caller: Argenis Decker    Relationship: Self    Best call back number: [phone]    What medication are you requesting: WHATEVER HER PCP RECOMMENDS - THE PATIENT HAS BEEN TRYING TO REACH HER NEUROLOGIST. THE NEUROLOGIST DOES NOT HAVE APPOINTMENTS TO SEE THE PATIENT UNTIL June.     What are your current symptoms: ONGOING MIGRAINE THAT IS NOT ALLEVIATED WITH OVER-THE-COUNTER MEDICATIONS LIKE EXCEDRIN, IBUPROFEN, AND MUSCLE RELAXERS.      How long have you been experiencing symptoms: SINCE Saturday MORNING    Have you had these symptoms before:    [x] Yes  [] No    Have you been treated for these symptoms before:   [x] Yes  [] No    If a prescription is needed, what is your preferred pharmacy and phone number: [id number] - [place] [id number] - [phone]  - [phone] FX

## 2022-02-08 NOTE — TELEPHONE ENCOUNTER
Pt called on call service with c/o migraine and neck pain for last 4 days, she reports a long hx of migraines and cervical spine surgery, she has tried many meds in the past, but reports symptoms have been well controlled on current regimen until recently. She over the last 4 days has tried robaxin, ibuprofen, excedrin, zofran, heat, rest without relief. 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 I ordered a medrol dose pack for the neck pain/tension and compazine, toradol for prn use for migraine. I also recommended she get a TENS unit and continue monthly massage. She will call tomorrow if symptoms are not improving.

## 2022-04-12 ENCOUNTER — TELEPHONE (OUTPATIENT)
Dept: FAMILY MEDICINE CLINIC | Facility: CLINIC | Age: 27
End: 2022-04-12

## 2022-04-12 NOTE — TELEPHONE ENCOUNTER
Caller: Argenis Decker    Relationship: Self    Best call back number: [phone]    What orders are you requesting (i.e. lab or imaging): ROUTINE LABS, ALSO INCLUDE AN IGRA TEST FOR NURSING SCHOOL    In what timeframe would the patient need to come in: DURING OR BEFORE HER PHYSICAL SCHEDULED ON 05/31/2022    Where will you receive your lab/imaging services: IN-OFFICE

## 2022-04-13 DIAGNOSIS — Z11.1 TUBERCULOSIS SCREENING: Primary | ICD-10-CM

## 2022-04-13 DIAGNOSIS — Z13.220 ENCOUNTER FOR SCREENING FOR LIPID DISORDER: ICD-10-CM

## 2022-04-13 DIAGNOSIS — Z00.00 WELLNESS EXAMINATION: ICD-10-CM

## 2022-04-13 NOTE — TELEPHONE ENCOUNTER
Caller: Argenis Decker     Relationship: SELF     Best call back number: [phone]     What is your medical concern?    THE IGRA IS SOMETHING LIKE A T-B TEST TYPE THING, OTHERWISE KNOWN AS , QUANTIFIER, IT IS LIKE CHECKING FOR TUBERLOCOIS.    DR STROOP DOES THIS EVERY YEAR  FOR NURSING SCHOOL      PLEASE CALL AND ADVISE PATIENT

## 2022-04-13 NOTE — TELEPHONE ENCOUNTER
I ordered lipids/ cmp and TB test-----see if that is what she is wanting and that I ordered right test

## 2022-04-14 NOTE — TELEPHONE ENCOUNTER
I think you just need to order a TSPOT as before. She said they are the same labs as last year, and that's what you ordered.     LM informing pt labs are ordered and to come fasting to her next appt.

## 2022-04-15 NOTE — TELEPHONE ENCOUNTER
"Nevermind, lab changed the order name in the system and you ordered the right one. The \"TSPOT\" is no longer in system. Pt just has to go to the hosp to have drawn now, as they have to send it out to [facility] within 2 hrs. Pt was informed.   "

## 2022-05-17 ENCOUNTER — TELEPHONE (OUTPATIENT)
Dept: FAMILY MEDICINE CLINIC | Facility: CLINIC | Age: 27
End: 2022-05-17

## 2022-05-17 ENCOUNTER — TRANSCRIBE ORDERS (OUTPATIENT)
Dept: ADMINISTRATIVE | Facility: HOSPITAL | Age: 27
End: 2022-05-17

## 2022-05-17 DIAGNOSIS — Z00.00 HEALTH CARE MAINTENANCE: Primary | ICD-10-CM

## 2022-05-17 DIAGNOSIS — Z01.84 IMMUNITY STATUS TESTING: Primary | ICD-10-CM

## 2022-05-17 NOTE — TELEPHONE ENCOUNTER
Caller: Argenis Decker    Relationship: Self    Best call back number: [phone]    What orders are you requesting (i.e. lab or imaging): HEP B TITER PANEL, MMR, TDAP, VARICELLA ADDED TO THE OTHER ORDERS THAT ARE IN FOR THE PATIENT.     Additional notes: PLEASE CALL TO ADVISE WHEN ORDERS ARE IN, PLEASE LEAVE MESSAGE FOR PATIENT IF SHE DOES NOT ANSWER.

## 2022-05-17 NOTE — TELEPHONE ENCOUNTER
HUB to share    LVM asking pt to clarify what labs/ immunizations she is needing? Is she going somewhere else to have drawn prior to her 5/31 appt here? She will have to go to the health dept for the Varicella immunization.

## 2022-05-18 ENCOUNTER — LAB (OUTPATIENT)
Dept: LAB | Facility: HOSPITAL | Age: 27
End: 2022-05-18

## 2022-05-18 DIAGNOSIS — Z13.220 ENCOUNTER FOR SCREENING FOR LIPID DISORDER: ICD-10-CM

## 2022-05-18 DIAGNOSIS — Z00.00 HEALTH CARE MAINTENANCE: ICD-10-CM

## 2022-05-18 DIAGNOSIS — Z00.00 WELLNESS EXAMINATION: ICD-10-CM

## 2022-05-18 DIAGNOSIS — Z01.84 IMMUNITY STATUS TESTING: ICD-10-CM

## 2022-05-18 DIAGNOSIS — Z11.1 TUBERCULOSIS SCREENING: ICD-10-CM

## 2022-05-18 LAB
ALBUMIN SERPL-MCNC: 4.7 G/DL (ref 3.5–5.2)
ALBUMIN/GLOB SERPL: 1.7 G/DL
ALP SERPL-CCNC: 82 U/L (ref 39–117)
ALT SERPL W P-5'-P-CCNC: 18 U/L (ref 1–33)
ANION GAP SERPL CALCULATED.3IONS-SCNC: 10.2 MMOL/L (ref 5–15)
AST SERPL-CCNC: 58 U/L (ref 1–32)
BILIRUB SERPL-MCNC: 1.6 MG/DL (ref 0–1.2)
BUN SERPL-MCNC: 6 MG/DL (ref 6–20)
BUN/CREAT SERPL: 8.5 (ref 7–25)
CALCIUM SPEC-SCNC: 9.3 MG/DL (ref 8.6–10.5)
CHLORIDE SERPL-SCNC: 106 MMOL/L (ref 98–107)
CHOLEST SERPL-MCNC: 129 MG/DL (ref 0–200)
CO2 SERPL-SCNC: 22.8 MMOL/L (ref 22–29)
CREAT SERPL-MCNC: 0.71 MG/DL (ref 0.57–1)
EGFRCR SERPLBLD CKD-EPI 2021: 121.2 ML/MIN/1.73
GLOBULIN UR ELPH-MCNC: 2.7 GM/DL
GLUCOSE SERPL-MCNC: 78 MG/DL (ref 65–99)
HBV SURFACE AB SER RIA-ACNC: REACTIVE
HBV SURFACE AG SERPL QL IA: NORMAL
HDLC SERPL-MCNC: 67 MG/DL (ref 40–60)
LDLC SERPL CALC-MCNC: 52 MG/DL (ref 0–100)
LDLC/HDLC SERPL: 0.82 {RATIO}
POTASSIUM SERPL-SCNC: 3.6 MMOL/L (ref 3.5–5.2)
PROT SERPL-MCNC: 7.4 G/DL (ref 6–8.5)
SODIUM SERPL-SCNC: 139 MMOL/L (ref 136–145)
TRIGL SERPL-MCNC: 36 MG/DL (ref 0–150)
VLDLC SERPL-MCNC: 10 MG/DL (ref 5–40)

## 2022-05-18 PROCEDURE — 86706 HEP B SURFACE ANTIBODY: CPT

## 2022-05-18 PROCEDURE — 36415 COLL VENOUS BLD VENIPUNCTURE: CPT

## 2022-05-18 PROCEDURE — 86765 RUBEOLA ANTIBODY: CPT

## 2022-05-18 PROCEDURE — 86787 VARICELLA-ZOSTER ANTIBODY: CPT

## 2022-05-18 PROCEDURE — 86735 MUMPS ANTIBODY: CPT

## 2022-05-18 PROCEDURE — 80053 COMPREHEN METABOLIC PANEL: CPT

## 2022-05-18 PROCEDURE — 86317 IMMUNOASSAY INFECTIOUS AGENT: CPT

## 2022-05-18 PROCEDURE — 87340 HEPATITIS B SURFACE AG IA: CPT

## 2022-05-18 PROCEDURE — 86762 RUBELLA ANTIBODY: CPT

## 2022-05-18 PROCEDURE — 86480 TB TEST CELL IMMUN MEASURE: CPT

## 2022-05-18 PROCEDURE — 80061 LIPID PANEL: CPT

## 2022-05-19 LAB
MEV IGG SER IA-ACNC: 20.6 AU/ML
MUV IGG SER IA-ACNC: 51.9 AU/ML
RUBV IGG SERPL IA-ACNC: 6.09 INDEX

## 2022-05-20 LAB
C DIPHTHERIAE AB SER IA-ACNC: 0.35 IU/ML
C TETANI TOXOID IGG SERPL IA-ACNC: 3.78 IU/ML
GAMMA INTERFERON BACKGROUND BLD IA-ACNC: 0.02 IU/ML
M TB IFN-G BLD-IMP: NEGATIVE
M TB IFN-G CD4+ BCKGRND COR BLD-ACNC: 0.02 IU/ML
M TB IFN-G CD4+CD8+ BCKGRND COR BLD-ACNC: 0.02 IU/ML
MITOGEN IGNF BLD-ACNC: >10 IU/ML
QUANTIFERON INCUBATION: NORMAL
SERVICE CMNT-IMP: NORMAL
VZV IGG SER IA-ACNC: NORMAL

## 2022-05-31 ENCOUNTER — OFFICE VISIT (OUTPATIENT)
Dept: FAMILY MEDICINE CLINIC | Facility: CLINIC | Age: 27
End: 2022-05-31

## 2022-05-31 VITALS
TEMPERATURE: 98.7 F | RESPIRATION RATE: 14 BRPM | HEART RATE: 93 BPM | SYSTOLIC BLOOD PRESSURE: 101 MMHG | WEIGHT: 106 LBS | OXYGEN SATURATION: 100 % | HEIGHT: 60 IN | DIASTOLIC BLOOD PRESSURE: 68 MMHG | BODY MASS INDEX: 20.81 KG/M2

## 2022-05-31 DIAGNOSIS — Z00.00 WELLNESS EXAMINATION: Primary | ICD-10-CM

## 2022-05-31 DIAGNOSIS — G43.109 MIGRAINE WITH AURA AND WITHOUT STATUS MIGRAINOSUS, NOT INTRACTABLE: ICD-10-CM

## 2022-05-31 DIAGNOSIS — K58.0 IRRITABLE BOWEL SYNDROME WITH DIARRHEA: ICD-10-CM

## 2022-05-31 DIAGNOSIS — F33.8 SEASONAL DEPRESSION: ICD-10-CM

## 2022-05-31 DIAGNOSIS — R74.8 ELEVATED LIVER ENZYMES: ICD-10-CM

## 2022-05-31 PROCEDURE — 80076 HEPATIC FUNCTION PANEL: CPT | Performed by: FAMILY MEDICINE

## 2022-05-31 PROCEDURE — 99395 PREV VISIT EST AGE 18-39: CPT | Performed by: FAMILY MEDICINE

## 2022-05-31 PROCEDURE — 36415 COLL VENOUS BLD VENIPUNCTURE: CPT | Performed by: FAMILY MEDICINE

## 2022-05-31 RX ORDER — SERTRALINE HYDROCHLORIDE 25 MG/1
TABLET, FILM COATED ORAL
Qty: 30 TABLET | Refills: 2 | Status: SHIPPED | OUTPATIENT
Start: 2022-05-31

## 2022-05-31 NOTE — PROGRESS NOTES
Venipuncture performed in left arm by Milena Cline CMA  with good hemostasis. Patient tolerated well. 05/31/22 Milena Cline CMA

## 2022-05-31 NOTE — PROGRESS NOTES
Subjective   Argenis Decker is a 26 y.o. female.     Chief Complaint   Patient presents with   • [facility] Physical for nursing.Patient has paperwork that needs signed         Current Outpatient Medications:   •  albuterol sulfate  (90 Base) MCG/ACT inhaler, Inhale 2 puffs Every 4 (Four) Hours As Needed., Disp: , Rfl:   •  aspirin-acetaminophen-caffeine (EXCEDRIN MIGRAINE) 250-250-65 MG per tablet, PRN, Disp: , Rfl:   •  chlorhexidine (PERIDEX) 0.12 % solution, Apply 15 mL to the mouth or throat 2 (Two) Times a Day., Disp: 473 mL, Rfl: 0  •  fluticasone-salmeterol (ADVAIR) 100-50 MCG/DOSE DISKUS, Inhale 1 puff 2 (Two) Times a Day., Disp: 60 each, Rfl: 1  •  Fremanezumab-vfrm (Ajovy) 225 MG/1.5ML solution auto-injector, Inject 1.5 mLs into the skin every 30 (thirty) days., Disp: , Rfl:   •  hydrOXYzine pamoate (Vistaril) 25 MG capsule, Take 1 capsule by mouth 3 (Three) Times a Day As Needed for Anxiety., Disp: 40 capsule, Rfl: 0  •  ibuprofen (ADVIL,MOTRIN) 800 MG tablet, TAKE 1 TABLET BY MOUTH EVERY SIX HOURS AS NEEDED FOR mild PAIN, Disp: 120 tablet, Rfl: 0  •  ketorolac (TORADOL) 10 MG tablet, Take 1-2 tablets by mouth 2 (Two) Times a Day As Needed (migraine)., Disp: 20 tablet, Rfl: 0  •  levonorgestrel (KYLEENA) 19.5 MG intrauterine device IUD, 1 each by Intrauterine route 1 (One) Time., Disp: , Rfl:   •  meclizine (ANTIVERT) 25 MG tablet, Take 1 tablet by mouth 3 (Three) Times a Day As Needed for Dizziness., Disp: 90 tablet, Rfl: 0  •  methocarbamol (Robaxin) 500 MG tablet, Take 1 tablet by mouth 2 (Two) Times a Day As Needed for Muscle Spasms., Disp: 30 tablet, Rfl: 1  •  ondansetron (Zofran) 4 MG tablet, Take 1 tablet by mouth Every 8 (Eight) Hours As Needed for Nausea or Vomiting., Disp: 30 tablet, Rfl: 1  •  prochlorperazine (COMPAZINE) 10 MG tablet, Take 1 tablet by mouth Every 6 (Six) Hours As Needed for Nausea or Vomiting (and migraine)., Disp: 20 tablet, Rfl: 0  •  promethazine  (PHENERGAN) 12.5 MG tablet, Take 1 tablet by mouth Every 6 (Six) Hours As Needed for Nausea or Vomiting., Disp: 40 tablet, Rfl: 0  •  sertraline (ZOLOFT) 25 MG tablet, 1/2 tab qhs during the winter, Disp: 30 tablet, Rfl: 2  •  traZODone (DESYREL) 100 MG tablet, Take 1 tablet by mouth At Night As Needed for Sleep., Disp: 30 tablet, Rfl: 1    Past Medical History:   Diagnosis Date   • Angioedema    • Anxiety    • Asthma    • Headache    • Irritable bowel syndrome    • Neck pain    • Panic attacks    • PAP     NEG = 2020      JFW   • Schwannoma    • Seasonal depression    • Visual impairment     computer/ studying only       Past Surgical History:   Procedure Laterality Date   • BREAST SURGERY Bilateral 2015    Silicone   • EAR TUBES  1997   • GAMMA KNIFE RESECTION OF SCHWANNOMA  02/20/2017     tumor from post c-spine   • SPINE SURGERY  2/20/2017    Dermoid fibroma removal of cervical spine   • WISDOM TOOTH EXTRACTION  2013       Family History   Problem Relation Age of Onset   • Hypertension Mother    • Cancer Mother         BCC   • Hypertension Father    • Other Father         GLAUCOMA   • Vision loss Father         Glaucoma   • Asthma Sister    • Other Sister         ADD   • Raynaud syndrome Sister    • Heart disease Maternal Grandmother         CVA x3   • Mental illness Maternal Grandmother         Schizophrenia   • Stroke Maternal Grandmother    • Cancer Maternal Grandfather         Skin Cancer   • Cancer Paternal Grandmother         Skin, Breast and Lung Cancer   • Heart disease Paternal Grandmother         MI- cardiac arrest   • Kidney disease Paternal Grandmother         CKD stage 3   • Heart disease Paternal Grandfather         MI- cardiac arrest   • Heart disease Other         Grandparents       Social History     Socioeconomic History   • Marital status: Single   Tobacco Use   • Smoking status: Current Every Day Smoker     Years: 1.00     Types: Electronic Cigarette     Start date: 2020   • Smokeless  tobacco: Never Used   • Tobacco comment: 1/2 ppweek   Vaping Use   • Vaping Use: Every day   • Start date: 1/1/2020   • Substances: Nicotine   • Devices: Disposable   Substance and Sexual Activity   • Alcohol use: Yes     Alcohol/week: 3.0 standard drinks     Types: 3 Cans of beer per week     Comment: 5   • Drug use: No   • Sexual activity: Yes     Partners: Male     Birth control/protection: I.U.D.     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    Review of Systems   Constitutional: Negative for activity change, appetite change, fatigue, unexpected weight gain and unexpected weight loss.   HENT: Negative for congestion, ear pain, hearing loss, nosebleeds, postnasal drip, rhinorrhea, sinus pressure, sneezing, sore throat, tinnitus and trouble swallowing.    Eyes: Negative for blurred vision and double vision.   Respiratory: Negative for cough and shortness of breath.    Cardiovascular: Negative for chest pain.   Gastrointestinal: Negative for abdominal pain, constipation, diarrhea, nausea, vomiting, GERD and indigestion.   Genitourinary: Negative for difficulty urinating, dysuria, flank pain, frequency, urgency and urinary incontinence.   Musculoskeletal: Negative for arthralgias and myalgias.   Skin: Negative for rash and skin lesions.   Neurological: Negative for weakness, memory problem and confusion.   Psychiatric/Behavioral: Negative for agitation, self-injury, suicidal ideas, depressed mood and stress. The patient is not nervous/anxious.        Vitals:    05/31/22 0846   BP: 101/68   Pulse: 93   Resp: 14   Temp: 98.7 °F (37.1 °C)   SpO2: 100%        Objective   Physical Exam  Vitals and nursing note reviewed.   Constitutional:       General: She is not in acute distress.     Appearance: Normal appearance. She is well-developed. She is not ill-appearing, toxic-appearing or diaphoretic.   HENT:      Head: Normocephalic and atraumatic.      Right Ear: Tympanic membrane, ear canal and external ear normal. There is no impacted cerumen.      Left Ear: Tympanic membrane, ear canal and external ear normal. There is no impacted cerumen.      Nose: Nose normal. No congestion or rhinorrhea.      Mouth/Throat:      Mouth: Mucous membranes are moist.      Pharynx: No oropharyngeal exudate or posterior oropharyngeal erythema.   Eyes:      Extraocular Movements: Extraocular movements intact.      Conjunctiva/sclera: Conjunctivae normal.      Pupils: Pupils are equal, round, and reactive to light.   Cardiovascular:      Rate and Rhythm: Normal rate and regular rhythm.      Heart sounds: Normal heart sounds. No murmur heard.  Pulmonary:      Effort: Pulmonary effort is normal. No respiratory distress.      Breath sounds: Normal breath sounds. No wheezing.   Abdominal:      General: Bowel sounds are normal. There is no distension.      Palpations: Abdomen is soft. There is no mass.      Tenderness: There is no abdominal tenderness. There is no guarding or rebound.      Hernia: No hernia is present.   Musculoskeletal:         General: Normal range of motion.      Cervical back: Normal range of motion and neck supple.      Right lower leg: No edema.      Left lower leg: No edema.   Lymphadenopathy:      Cervical: No cervical adenopathy.   Skin:     General: Skin is warm and dry.      Findings: No rash.          Neurological:      General: No focal deficit present.      Mental Status: She is alert and oriented to person, place, and time.      Cranial Nerves: No cranial nerve deficit.      Motor: No weakness.      Coordination: Coordination normal.      Gait: Gait normal.    Psychiatric:         Mood and Affect: Mood normal.         Behavior: Behavior normal.         Thought Content: Thought content normal.         Judgment: Judgment normal.           Assessment & Plan   Diagnoses and all orders for this visit:    1. Wellness examination (Primary)    2. Elevated liver enzymes  -     Hepatic Function Panel; Future  -     Hepatic Function Panel    3. Migraine with aura and without status migrainosus, not intractable    4. Irritable bowel syndrome with diarrhea    5. Seasonal depression (HCC)    Other orders  -     sertraline (ZOLOFT) 25 MG tablet; 1/2 tab qhs during the winter  Dispense: 30 tablet; Refill: 2    reviewed labs/ paperwork done and given to pt  F/u lft's today  Pt to cont w/ high fiber healthy diet and regular exercise

## 2022-06-01 LAB
ALBUMIN SERPL-MCNC: 4.4 G/DL (ref 3.5–5.2)
ALP SERPL-CCNC: 73 U/L (ref 39–117)
ALT SERPL W P-5'-P-CCNC: 11 U/L (ref 1–33)
AST SERPL-CCNC: 19 U/L (ref 1–32)
BILIRUB CONJ SERPL-MCNC: 0.3 MG/DL (ref 0–0.3)
BILIRUB INDIRECT SERPL-MCNC: 0.9 MG/DL
BILIRUB SERPL-MCNC: 1.2 MG/DL (ref 0–1.2)
PROT SERPL-MCNC: 7.4 G/DL (ref 6–8.5)

## 2022-07-20 ENCOUNTER — TELEPHONE (OUTPATIENT)
Dept: FAMILY MEDICINE CLINIC | Facility: CLINIC | Age: 27
End: 2022-07-20

## 2022-07-20 NOTE — TELEPHONE ENCOUNTER
PATIENT WANTED TO LET DOCTOR KNOW THERE WERE A FEW BOXES THAT WERE NOT CHECKED ON PAPERWORK THAT WAS FILLED OUT FOR HER FOR NURSING. SHE IS GOING TO FAX IT BACK TO HAVE THIS FILLED OUT THE REST OF THE WAY.

## 2022-07-27 NOTE — TELEPHONE ENCOUNTER
I don't have this paperwork. I will print out the old copy if you can fill in whatever was missing that she needed.

## 2022-07-27 NOTE — TELEPHONE ENCOUNTER
HUB to read    Paperwork was resigned by . I made a copy for the chart and left the patient a copy to  up front. I called and left the patient a message letting her know that the paperwork is ready for .

## 2022-08-09 RX ORDER — BUSPIRONE HYDROCHLORIDE 5 MG/1
5 TABLET ORAL 3 TIMES DAILY
Qty: 90 TABLET | Refills: 0 | Status: SHIPPED | OUTPATIENT
Start: 2022-08-09

## 2022-08-23 RX ORDER — METHOCARBAMOL 500 MG/1
TABLET, FILM COATED ORAL
Qty: 30 TABLET | Refills: 0 | Status: SHIPPED | OUTPATIENT
Start: 2022-08-23 | End: 2023-03-06

## 2022-08-23 NOTE — TELEPHONE ENCOUNTER
Rx Refill Note  Requested Prescriptions     Pending Prescriptions Disp Refills   • methocarbamol (ROBAXIN) 500 MG tablet [Pharmacy Med Name: Methocarbamol Oral Tablet 500 MG] 30 tablet 0     Sig: TAKE 1 TABLET BY MOUTH TWO TIMES A DAY AS NEEDED for muscle spasms      Last office visit with prescribing clinician: 5/31/2022      Next office visit with prescribing clinician: Visit date not found     Lipid Panel (05/18/2022 12:06)         Milena Cline CMA  08/23/22, 15:57 EDT

## 2022-08-25 RX ORDER — KETOROLAC TROMETHAMINE 10 MG/1
TABLET, FILM COATED ORAL
Qty: 20 TABLET | Refills: 0 | OUTPATIENT
Start: 2022-08-25

## 2022-09-16 RX ORDER — IBUPROFEN 800 MG/1
TABLET ORAL
Qty: 120 TABLET | Refills: 0 | Status: SHIPPED | OUTPATIENT
Start: 2022-09-16

## 2022-09-16 NOTE — TELEPHONE ENCOUNTER
Rx Refill Note  Requested Prescriptions     Pending Prescriptions Disp Refills   • ibuprofen (ADVIL,MOTRIN) 800 MG tablet [Pharmacy Med Name: Ibuprofen Oral Tablet 800 MG] 120 tablet 0     Sig: TAKE 1 TABLET BY MOUTH EVERY SIX HOURS AS NEEDED FOR mild PAIN      Last office visit with prescribing clinician: 5/31/2022      Next office visit with prescribing clinician: Visit date not found     Lipid Panel (05/18/2022 12:06)         Yamila Das, RT  09/16/22, 09:10 EDT   Home

## 2022-11-17 NOTE — TELEPHONE ENCOUNTER
Caller: Argenis Decker    Relationship: Self    Best call back number:   [phone]     What orders are you requesting (i.e. lab or imaging): TITERS FOR TDAP, MMR, HEP B PANEL AND VARICELLA     In what timeframe would the patient need to come in: WOULD LIKE THE ORDERS SENT TODAY, BEFORE 5/31 APPT SO PATIENT CAN GO OVER RESULTS AT THE 5/31 APPT TO SEE IF ANYTHING ELSE IS NEEDED     Where will you receive your lab/imaging services: [facility]     Additional notes: PLEASE CALL PATIENT TO LET HER KNOW WHEN THE ORDERS HAVE BEEN SENT THROUGH    HUB READ MESSAGE. PATIENT UNDERSTOOD. THIS IS PATIENTS RESPONSE          SCM

## 2022-11-30 ENCOUNTER — OFFICE VISIT (OUTPATIENT)
Dept: FAMILY MEDICINE CLINIC | Facility: CLINIC | Age: 27
End: 2022-11-30

## 2022-11-30 VITALS
HEIGHT: 60 IN | SYSTOLIC BLOOD PRESSURE: 119 MMHG | OXYGEN SATURATION: 100 % | DIASTOLIC BLOOD PRESSURE: 85 MMHG | WEIGHT: 106 LBS | HEART RATE: 89 BPM | TEMPERATURE: 98.4 F | BODY MASS INDEX: 20.81 KG/M2 | RESPIRATION RATE: 18 BRPM

## 2022-11-30 DIAGNOSIS — R05.8 OTHER COUGH: Primary | ICD-10-CM

## 2022-11-30 DIAGNOSIS — J06.9 VIRAL UPPER RESPIRATORY TRACT INFECTION: ICD-10-CM

## 2022-11-30 DIAGNOSIS — R50.9 FEVER, UNSPECIFIED FEVER CAUSE: ICD-10-CM

## 2022-11-30 LAB
EXPIRATION DATE: NORMAL
FLUAV AG UPPER RESP QL IA.RAPID: NOT DETECTED
FLUBV AG UPPER RESP QL IA.RAPID: NOT DETECTED
INTERNAL CONTROL: NORMAL
Lab: NORMAL
SARS-COV-2 AG UPPER RESP QL IA.RAPID: NOT DETECTED

## 2022-11-30 PROCEDURE — 99213 OFFICE O/P EST LOW 20 MIN: CPT | Performed by: NURSE PRACTITIONER

## 2022-11-30 PROCEDURE — 87428 SARSCOV & INF VIR A&B AG IA: CPT | Performed by: NURSE PRACTITIONER

## 2022-11-30 RX ORDER — GUAIFENESIN AND CODEINE PHOSPHATE 100; 10 MG/5ML; MG/5ML
5 SOLUTION ORAL 3 TIMES DAILY PRN
Qty: 473 ML | Refills: 0 | Status: SHIPPED | OUTPATIENT
Start: 2022-11-30

## 2022-11-30 RX ORDER — METHYLPREDNISOLONE 4 MG/1
TABLET ORAL
Qty: 21 TABLET | Refills: 0 | Status: SHIPPED | OUTPATIENT
Start: 2022-11-30

## 2022-11-30 NOTE — PROGRESS NOTES
"Chief Complaint  Chief Complaint   Patient presents with   • Cough   • Fever        Subjective          Argenis Decker is a 27 year old female who presents to the office today with complaints of cough and congestion.     Cough and congestion- Started on Monday. Has had cough and congested. Having a barking cough. Cough up clear, small amount of yellow phelgm. Slept 18 hours Monday. Has had fever, chills, body aches. Received flu vaccine and has had COVID in the past. Right ear is painful and has sore throat. Tried tylenol, ibuprofen, mucinex, dextromorphan at home. Works as a nurse in the emergency department.        Review of Systems   Constitutional: Positive for chills, fatigue and fever.   HENT: Positive for congestion, ear pain and sore throat.    Respiratory: Positive for cough and shortness of breath.    Cardiovascular: Positive for palpitations. Negative for chest pain.   Gastrointestinal: Negative for abdominal pain, nausea and vomiting.   Genitourinary: Negative for difficulty urinating.   Musculoskeletal: Positive for arthralgias.   Skin: Negative.    Neurological: Positive for light-headedness. Negative for dizziness and headache.   Psychiatric/Behavioral: Negative for depressed mood. The patient is not nervous/anxious.         Objective   Vital Signs:   Vitals:    11/30/22 1039   BP: 119/85   Pulse: 89   Resp: 18   Temp: 98.4 °F (36.9 °C)   SpO2: 100%      Estimated body mass index is 20.7 kg/m² as calculated from the following:    Height as of this encounter: 152.4 cm (60\").    Weight as of this encounter: 48.1 kg (106 lb).            Physical Exam  Vitals reviewed.   Constitutional:       Appearance: Normal appearance. She is normal weight. She is ill-appearing.   HENT:      Head: Normocephalic and atraumatic.      Left Ear: Tympanic membrane normal.      Ears:      Comments: Fluid noted in right ear      Nose: Nose normal.      Mouth/Throat:      Mouth: Mucous membranes are moist.      Pharynx: " Oropharynx is clear. Posterior oropharyngeal erythema present.   Eyes:      Extraocular Movements: Extraocular movements intact.      Conjunctiva/sclera: Conjunctivae normal.   Cardiovascular:      Rate and Rhythm: Regular rhythm. Tachycardia present.      Pulses: Normal pulses.      Heart sounds: Normal heart sounds.      Comments: S1, S2 audible  Pulmonary:      Effort: Pulmonary effort is normal.      Breath sounds: Normal breath sounds.      Comments: On room air   Abdominal:      General: Abdomen is flat. Bowel sounds are normal.      Palpations: Abdomen is soft.   Musculoskeletal:         General: Normal range of motion.      Cervical back: Normal range of motion.   Skin:     General: Skin is warm and dry.   Neurological:      General: No focal deficit present.      Mental Status: She is alert and oriented to person, place, and time. Mental status is at baseline.   Psychiatric:         Mood and Affect: Mood normal.         Behavior: Behavior normal.         Thought Content: Thought content normal.         Judgment: Judgment normal.                Physical Exam   Result Review :             Procedures       Assessment and Plan     Diagnoses and all orders for this visit:    1. Other cough (Primary)  -     POCT SARS-CoV-2 Antigen ODETTE    2. Fever, unspecified fever cause  -     POCT SARS-CoV-2 Antigen ODETTE    3. Viral upper respiratory tract infection  Assessment & Plan:  Started on Monday. Has had cough and congested. Having a barking cough. Cough up clear, small amount of yellow phelgm. Slept 18 hours Monday. Has had fever, chills, body aches. Received flu vaccine and has had COVID in the past. Right ear is painful and has sore throat. Tried tylenol, ibuprofen, mucinex, dextromorphan at home. Works as a nurse in the emergency department.     COVID and Flu negative    Prescribed bromfed and steroid pack   Encouraged cough and deep breathing  May return to work is fever free for 24 hours   Stay hydrated      Orders:  -     guaiFENesin-codeine (GUAIFENESIN AC) 100-10 MG/5ML liquid; Take 5 mL by mouth 3 (Three) Times a Day As Needed for Cough.  Dispense: 473 mL; Refill: 0    Other orders  -     methylPREDNISolone (MEDROL) 4 MG dose pack; Take as directed on package instructions.  Dispense: 21 tablet; Refill: 0            Follow Up   Return if symptoms worsen or fail to improve.   Patient was given instructions and counseling regarding her condition or for health maintenance advice. Please see specific information pulled into the AVS if appropriate.

## 2022-11-30 NOTE — PATIENT INSTRUCTIONS
Prescribed bromfed and steroid pack   Encouraged cough and deep breathing  May return to work is fever free for 24 hours   Stay hydrated   Cepacol cough drops

## 2022-11-30 NOTE — ASSESSMENT & PLAN NOTE
Started on Monday. Has had cough and congested. Having a barking cough. Cough up clear, small amount of yellow phelgm. Slept 18 hours Monday. Has had fever, chills, body aches. Received flu vaccine and has had COVID in the past. Right ear is painful and has sore throat. Tried tylenol, ibuprofen, mucinex, dextromorphan at home. Works as a nurse in the emergency department.     COVID and Flu negative    Prescribed bromfed and steroid pack   Encouraged cough and deep breathing  May return to work is fever free for 24 hours   Stay hydrated

## 2023-03-06 RX ORDER — METHOCARBAMOL 500 MG/1
TABLET, FILM COATED ORAL
Qty: 30 TABLET | Refills: 0 | Status: SHIPPED | OUTPATIENT
Start: 2023-03-06

## 2023-03-06 NOTE — TELEPHONE ENCOUNTER
Rx Refill Note  Requested Prescriptions     Pending Prescriptions Disp Refills   • methocarbamol (ROBAXIN) 500 MG tablet [Pharmacy Med Name: Methocarbamol Oral Tablet 500 MG] 30 tablet 0     Sig: TAKE 1 TABLET BY MOUTH TWO TIMES A DAY AS NEEDED FOR MUSCLE SPASMS      Last office visit with prescribing clinician: 5/31/2022   Last telemedicine visit with prescribing clinician: Visit date not found   Next office visit with prescribing clinician: Visit date not found                         Would you like a call back once the refill request has been completed: [] Yes [] No    If the office needs to give you a call back, can they leave a voicemail: [] Yes [] No    Milena Ibrahim, NANI  03/06/23, 17:53 EST

## 2023-04-14 ENCOUNTER — TELEPHONE (OUTPATIENT)
Dept: FAMILY MEDICINE CLINIC | Facility: CLINIC | Age: 28
End: 2023-04-14

## 2023-04-14 DIAGNOSIS — R30.0 DYSURIA: Primary | ICD-10-CM

## 2023-04-14 NOTE — TELEPHONE ENCOUNTER
Caller: Argenis Ruffin    Relationship: Self    Best call back number: [phone]    What medication are you requesting: UNKNOWN    What are your current symptoms: BURNING URINATION. FREQUENCY, FEELS LIKE SHE CANT EMPTY HER BLADDER     How long have you been experiencing symptoms: 2 DAYS     If a prescription is needed, what is your preferred pharmacy and phone number: [id number] - [facility] [address] - [phone] [facility] [phone] FX     PLEASE CALL PATIENT WHEN PRESCRIPTION IS SENT

## 2023-05-25 ENCOUNTER — HOSPITAL ENCOUNTER (EMERGENCY)
Facility: HOSPITAL | Age: 28
Discharge: HOME | End: 2023-05-25
Attending: EMERGENCY MEDICINE | Admitting: EMERGENCY MEDICINE
Payer: COMMERCIAL

## 2023-05-25 ENCOUNTER — APPOINTMENT (OUTPATIENT)
Dept: CT IMAGING | Facility: HOSPITAL | Age: 28
End: 2023-05-25
Payer: COMMERCIAL

## 2023-05-25 VITALS
HEART RATE: 63 BPM | OXYGEN SATURATION: 98 % | RESPIRATION RATE: 16 BRPM | TEMPERATURE: 97.6 F | HEIGHT: 60 IN | BODY MASS INDEX: 20.62 KG/M2 | WEIGHT: 105 LBS | SYSTOLIC BLOOD PRESSURE: 100 MMHG | DIASTOLIC BLOOD PRESSURE: 63 MMHG

## 2023-05-25 DIAGNOSIS — N20.1 RIGHT URETERAL STONE: Primary | ICD-10-CM

## 2023-05-25 LAB
ANION GAP SERPL CALCULATED.3IONS-SCNC: 11 MMOL/L (ref 5–15)
B-HCG UR QL: NEGATIVE
BACTERIA UR QL AUTO: ABNORMAL /HPF
BASOPHILS # BLD AUTO: 0.1 10*3/MM3 (ref 0–0.2)
BASOPHILS NFR BLD AUTO: 1 % (ref 0–1.5)
BILIRUB UR QL STRIP: NEGATIVE
BUN SERPL-MCNC: 10 MG/DL (ref 6–20)
BUN/CREAT SERPL: 12.3 (ref 7–25)
CALCIUM SPEC-SCNC: 9.5 MG/DL (ref 8.6–10.5)
CHLORIDE SERPL-SCNC: 102 MMOL/L (ref 98–107)
CLARITY UR: ABNORMAL
CO2 SERPL-SCNC: 26 MMOL/L (ref 22–29)
COD CRY URNS QL: ABNORMAL /HPF
COLOR UR: ABNORMAL
CREAT SERPL-MCNC: 0.81 MG/DL (ref 0.57–1)
DEPRECATED RDW RBC AUTO: 42.4 FL (ref 37–54)
EGFRCR SERPLBLD CKD-EPI 2021: 102.8 ML/MIN/1.73
EOSINOPHIL # BLD AUTO: 0.1 10*3/MM3 (ref 0–0.4)
EOSINOPHIL NFR BLD AUTO: 1.5 % (ref 0.3–6.2)
ERYTHROCYTE [DISTWIDTH] IN BLOOD BY AUTOMATED COUNT: 12.9 % (ref 12.3–15.4)
GLUCOSE SERPL-MCNC: 113 MG/DL (ref 65–99)
GLUCOSE UR STRIP-MCNC: NEGATIVE MG/DL
HCT VFR BLD AUTO: 39 % (ref 34–46.6)
HGB BLD-MCNC: 13.2 G/DL (ref 12–15.9)
HGB UR QL STRIP.AUTO: ABNORMAL
HOLD SPECIMEN: NORMAL
HOLD SPECIMEN: NORMAL
HYALINE CASTS UR QL AUTO: ABNORMAL /LPF
KETONES UR QL STRIP: NEGATIVE
LEUKOCYTE ESTERASE UR QL STRIP.AUTO: ABNORMAL
LYMPHOCYTES # BLD AUTO: 2.8 10*3/MM3 (ref 0.7–3.1)
LYMPHOCYTES NFR BLD AUTO: 48.6 % (ref 19.6–45.3)
MCH RBC QN AUTO: 30.1 PG (ref 26.6–33)
MCHC RBC AUTO-ENTMCNC: 33.9 G/DL (ref 31.5–35.7)
MCV RBC AUTO: 88.7 FL (ref 79–97)
MONOCYTES # BLD AUTO: 0.4 10*3/MM3 (ref 0.1–0.9)
MONOCYTES NFR BLD AUTO: 7.1 % (ref 5–12)
NEUTROPHILS NFR BLD AUTO: 2.4 10*3/MM3 (ref 1.7–7)
NEUTROPHILS NFR BLD AUTO: 41.8 % (ref 42.7–76)
NITRITE UR QL STRIP: NEGATIVE
NRBC BLD AUTO-RTO: 0.1 /100 WBC (ref 0–0.2)
PH UR STRIP.AUTO: <=5 [PH] (ref 5–8)
PLATELET # BLD AUTO: 236 10*3/MM3 (ref 140–450)
PMV BLD AUTO: 8.6 FL (ref 6–12)
POTASSIUM SERPL-SCNC: 3.5 MMOL/L (ref 3.5–5.2)
PROT UR QL STRIP: ABNORMAL
RBC # BLD AUTO: 4.39 10*6/MM3 (ref 3.77–5.28)
RBC # UR STRIP: ABNORMAL /HPF
REF LAB TEST METHOD: ABNORMAL
SODIUM SERPL-SCNC: 139 MMOL/L (ref 136–145)
SP GR UR STRIP: 1.02 (ref 1–1.03)
SQUAMOUS #/AREA URNS HPF: ABNORMAL /HPF
UROBILINOGEN UR QL STRIP: ABNORMAL
WBC # UR STRIP: ABNORMAL /HPF
WBC NRBC COR # BLD: 5.8 10*3/MM3 (ref 3.4–10.8)
WHOLE BLOOD HOLD COAG: NORMAL
WHOLE BLOOD HOLD SPECIMEN: NORMAL
YEAST URNS QL MICRO: ABNORMAL /HPF

## 2023-05-25 PROCEDURE — 81001 URINALYSIS AUTO W/SCOPE: CPT | Performed by: EMERGENCY MEDICINE

## 2023-05-25 PROCEDURE — 96374 THER/PROPH/DIAG INJ IV PUSH: CPT

## 2023-05-25 PROCEDURE — 25010000002 KETOROLAC TROMETHAMINE PER 15 MG: Performed by: EMERGENCY MEDICINE

## 2023-05-25 PROCEDURE — 99283 EMERGENCY DEPT VISIT LOW MDM: CPT

## 2023-05-25 PROCEDURE — 81025 URINE PREGNANCY TEST: CPT | Performed by: EMERGENCY MEDICINE

## 2023-05-25 PROCEDURE — 80048 BASIC METABOLIC PNL TOTAL CA: CPT | Performed by: EMERGENCY MEDICINE

## 2023-05-25 PROCEDURE — 25010000002 ONDANSETRON PER 1 MG: Performed by: EMERGENCY MEDICINE

## 2023-05-25 PROCEDURE — 85025 COMPLETE CBC W/AUTO DIFF WBC: CPT | Performed by: EMERGENCY MEDICINE

## 2023-05-25 PROCEDURE — 96375 TX/PRO/DX INJ NEW DRUG ADDON: CPT

## 2023-05-25 PROCEDURE — 74176 CT ABD & PELVIS W/O CONTRAST: CPT

## 2023-05-25 PROCEDURE — 87086 URINE CULTURE/COLONY COUNT: CPT | Performed by: EMERGENCY MEDICINE

## 2023-05-25 PROCEDURE — 96361 HYDRATE IV INFUSION ADD-ON: CPT

## 2023-05-25 RX ORDER — HYDROCODONE BITARTRATE AND ACETAMINOPHEN 5; 325 MG/1; MG/1
1 TABLET ORAL EVERY 4 HOURS PRN
Qty: 20 TABLET | Refills: 0 | Status: SHIPPED | OUTPATIENT
Start: 2023-05-25

## 2023-05-25 RX ORDER — TAMSULOSIN HYDROCHLORIDE 0.4 MG/1
1 CAPSULE ORAL DAILY
Qty: 30 CAPSULE | Refills: 0 | Status: SHIPPED | OUTPATIENT
Start: 2023-05-25

## 2023-05-25 RX ORDER — KETOROLAC TROMETHAMINE 15 MG/ML
15 INJECTION, SOLUTION INTRAMUSCULAR; INTRAVENOUS ONCE
Status: COMPLETED | OUTPATIENT
Start: 2023-05-25 | End: 2023-05-25

## 2023-05-25 RX ORDER — ONDANSETRON 2 MG/ML
4 INJECTION INTRAMUSCULAR; INTRAVENOUS ONCE
Status: COMPLETED | OUTPATIENT
Start: 2023-05-25 | End: 2023-05-25

## 2023-05-25 RX ORDER — ONDANSETRON 4 MG/1
4 TABLET, FILM COATED ORAL EVERY 8 HOURS PRN
Qty: 20 TABLET | Refills: 0 | Status: SHIPPED | OUTPATIENT
Start: 2023-05-25

## 2023-05-25 RX ADMIN — KETOROLAC TROMETHAMINE 15 MG: 15 INJECTION, SOLUTION INTRAMUSCULAR; INTRAVENOUS at 03:31

## 2023-05-25 RX ADMIN — SODIUM CHLORIDE 1000 ML: 9 INJECTION, SOLUTION INTRAVENOUS at 03:31

## 2023-05-25 RX ADMIN — ONDANSETRON 4 MG: 2 INJECTION INTRAMUSCULAR; INTRAVENOUS at 03:31

## 2023-05-25 NOTE — Clinical Note
[facility] EMERGENCY DEPARTMENT  [address] [place] [address]  Phone: [phone]    Argenis Ruffin was seen and treated in our emergency department on 5/25/2023.  She may return to work on 05/30/2023.         Thank you for choosing [facility].    Adan Taylor MD

## 2023-05-25 NOTE — ED PROVIDER NOTES
Subjective   History of Present Illness  27-year-old female with severe right flank pain onset at 2 AM, nontraumatic associated with several episodes of emesis, no abdominal pain, no fever.        Review of Systems   Constitutional: Negative.    Gastrointestinal: Positive for nausea and vomiting. Negative for abdominal pain.   Genitourinary: Positive for flank pain.       Past Medical History:   Diagnosis Date   • Angioedema    • Anxiety    • Asthma    • Headache    • Irritable bowel syndrome    • Neck pain    • Panic attacks    • PAP     NEG = 2020      JFW   • Schwannoma    • Seasonal depression    • Visual impairment     computer/ studying only       Allergies   Allergen Reactions   • Shrimp Hives   • Cymbalta [Duloxetine Hcl] Other (See Comments)     INSOMNIA   • Gadolinium Derivatives GI Intolerance   • Imitrex [Sumatriptan] Other (See Comments)     Chest pain   • Iodinated Contrast Media Other (See Comments)     Pt states she gets hot,delayed adverse reaction   • Maxalt [Rizatriptan Benzoate] Other (See Comments)     Chest pain       Past Surgical History:   Procedure Laterality Date   • BREAST SURGERY Bilateral 2015    Silicone   • EAR TUBES  1997   • GAMMA KNIFE RESECTION OF SCHWANNOMA  02/20/2017     tumor from post c-spine   • SPINE SURGERY  2/20/2017    Dermoid fibroma removal of cervical spine   • WISDOM TOOTH EXTRACTION  2013       Family History   Problem Relation Age of Onset   • Hypertension Mother    • Cancer Mother         BCC   • Hypertension Father    • Other Father         GLAUCOMA   • Vision loss Father         Glaucoma   • Asthma Sister    • Other Sister         ADD   • Raynaud syndrome Sister    • Heart disease Maternal Grandmother         CVA x3   • Mental illness Maternal Grandmother         Schizophrenia   • Stroke Maternal Grandmother    • Cancer Maternal Grandfather         Skin Cancer   • Cancer Paternal Grandmother         Skin, Breast and Lung Cancer   • Heart disease Paternal  Grandmother         MI- cardiac arrest   • Kidney disease Paternal Grandmother         CKD stage 3   • Heart disease Paternal Grandfather         MI- cardiac arrest   • Heart disease Other         Grandparents       Social History     Socioeconomic History   • Marital status:    Tobacco Use   • Smoking status: Every Day     Types: Electronic Cigarette     Start date: 2020   • Smokeless tobacco: Never   • Tobacco comments:     1/2 ppweek   Vaping Use   • Vaping Use: Every day   • Start date: 1/1/2020   • Substances: Nicotine   • Devices: Disposable   Substance and Sexual Activity   • Alcohol use: Yes     Alcohol/week: 3.0 standard drinks     Types: 3 Cans of beer per week     Comment: 5   • Drug use: No   • Sexual activity: Yes     Partners: Male     Birth control/protection: I.U.D.     Comment: rByan           Objective   Physical Exam  Constitutional:       Appearance: Normal appearance.   HENT:      Head: Normocephalic and atraumatic.   Cardiovascular:      Rate and Rhythm: Normal rate and regular rhythm.      Heart sounds: Normal heart sounds.   Pulmonary:      Effort: Pulmonary effort is normal.      Breath sounds: Normal breath sounds.   Abdominal:      General: Bowel sounds are normal. There is no distension.      Palpations: Abdomen is soft.      Tenderness: There is no abdominal tenderness.   Skin:     General: Skin is warm and dry.      Capillary Refill: Capillary refill takes less than 2 seconds.   Neurological:      General: No focal deficit present.      Mental Status: She is alert and oriented to person, place, and time.         Procedures           ED Course                                           Medical Decision Making  Pain controlled, patient appears well, contaminated urine but no convincing evidence of UTI, return precautions reviewed, inspect reviewed    Right ureteral stone: acute illness or injury     Details: As above  Amount and/or Complexity of Data Reviewed  Labs: ordered.      Details: WBC 5  Radiology: ordered.     Details: Right ureteral stone 2 mm      Risk  Prescription drug management.          Final diagnoses:   Right ureteral stone       ED Disposition  ED Disposition     ED Disposition   Discharge    Condition   Stable    Comment   --             [facility]  [address] [address]  [phone]             Medication List      New Prescriptions    HYDROcodone-acetaminophen 5-325 MG per tablet  Commonly known as: NORCO  Take 1 tablet by mouth Every 4 (Four) Hours As Needed for Moderate Pain.     tamsulosin 0.4 MG capsule 24 hr capsule  Commonly known as: FLOMAX  Take 1 capsule by mouth Daily.        Changed    * ondansetron 4 MG tablet  Commonly known as: Zofran  Take 1 tablet by mouth Every 8 (Eight) Hours As Needed for Nausea or Vomiting.  What changed: Another medication with the same name was added. Make sure you understand how and when to take each.     * ondansetron 4 MG tablet  Commonly known as: ZOFRAN  Take 1 tablet by mouth Every 8 (Eight) Hours As Needed for Nausea or Vomiting.  What changed: You were already taking a medication with the same name, and this prescription was added. Make sure you understand how and when to take each.         * This list has 2 medication(s) that are the same as other medications prescribed for you. Read the directions carefully, and ask your doctor or other care provider to review them with you.               Where to Get Your Medications      These medications were sent to [id number] - [place], [place] - [address] [place] [address] AT NEC OF  & [address] [facility] - [phone]  - [phone] [address], [place] [address]    Phone: [phone]   · HYDROcodone-acetaminophen 5-325 MG per tablet  · ondansetron 4 MG tablet  · tamsulosin 0.4 MG capsule 24 hr capsule          Adan Taylor MD  05/25/23 9636

## 2023-05-26 LAB — BACTERIA SPEC AEROBE CULT: NO GROWTH

## 2023-06-09 ENCOUNTER — OFFICE VISIT (OUTPATIENT)
Dept: FAMILY MEDICINE CLINIC | Facility: CLINIC | Age: 28
End: 2023-06-09
Payer: COMMERCIAL

## 2023-06-09 VITALS
DIASTOLIC BLOOD PRESSURE: 74 MMHG | HEART RATE: 99 BPM | WEIGHT: 106 LBS | TEMPERATURE: 98.2 F | OXYGEN SATURATION: 99 % | RESPIRATION RATE: 16 BRPM | BODY MASS INDEX: 20.81 KG/M2 | HEIGHT: 60 IN | SYSTOLIC BLOOD PRESSURE: 107 MMHG

## 2023-06-09 DIAGNOSIS — R74.8 ELEVATED LIVER ENZYMES: ICD-10-CM

## 2023-06-09 DIAGNOSIS — F41.9 ANXIETY: Primary | ICD-10-CM

## 2023-06-09 DIAGNOSIS — G43.709 CHRONIC MIGRAINE WITHOUT AURA WITHOUT STATUS MIGRAINOSUS, NOT INTRACTABLE: ICD-10-CM

## 2023-06-09 DIAGNOSIS — F33.8 SEASONAL DEPRESSION: ICD-10-CM

## 2023-06-09 DIAGNOSIS — Z13.220 ENCOUNTER FOR SCREENING FOR LIPID DISORDER: ICD-10-CM

## 2023-06-09 DIAGNOSIS — F51.02 ADJUSTMENT INSOMNIA: ICD-10-CM

## 2023-06-09 DIAGNOSIS — R00.2 PALPITATIONS: ICD-10-CM

## 2023-06-09 PROCEDURE — 99214 OFFICE O/P EST MOD 30 MIN: CPT | Performed by: FAMILY MEDICINE

## 2023-06-09 RX ORDER — METOPROLOL SUCCINATE 25 MG/1
25 TABLET, EXTENDED RELEASE ORAL NIGHTLY PRN
Qty: 30 TABLET | Refills: 0 | Status: SHIPPED | OUTPATIENT
Start: 2023-06-09

## 2023-06-09 RX ORDER — BUPROPION HYDROCHLORIDE 150 MG/1
150 TABLET ORAL EVERY MORNING
Qty: 30 TABLET | Refills: 1 | Status: SHIPPED | OUTPATIENT
Start: 2023-06-09

## 2023-06-09 RX ORDER — HYDROXYZINE PAMOATE 25 MG/1
25 CAPSULE ORAL 3 TIMES DAILY PRN
Qty: 60 CAPSULE | Refills: 0 | Status: SHIPPED | OUTPATIENT
Start: 2023-06-09

## 2023-06-09 RX ORDER — BUSPIRONE HYDROCHLORIDE 10 MG/1
TABLET ORAL
Qty: 90 TABLET | Refills: 0 | Status: SHIPPED | OUTPATIENT
Start: 2023-06-09

## 2023-06-09 RX ORDER — ZALEPLON 10 MG/1
CAPSULE ORAL
Qty: 40 CAPSULE | Refills: 0 | Status: SHIPPED | OUTPATIENT
Start: 2023-06-09

## 2023-06-09 NOTE — PROGRESS NOTES
Subjective   Argenis Ruffin is a 27 y.o. female.     Chief Complaint   Patient presents with    Psych referral     Wants to increase Buspar    Anxiety         Current Outpatient Medications:     albuterol sulfate  (90 Base) MCG/ACT inhaler, Inhale 2 puffs Every 4 (Four) Hours As Needed., Disp: , Rfl:     aspirin-acetaminophen-caffeine (EXCEDRIN MIGRAINE) 250-250-65 MG per tablet, PRN, Disp: , Rfl:     fluticasone-salmeterol (ADVAIR) 100-50 MCG/DOSE DISKUS, Inhale 1 puff 2 (Two) Times a Day. (Patient taking differently: Inhale 1 puff 2 (Two) Times a Day. During the winter PRN), Disp: 60 each, Rfl: 1    hydrOXYzine pamoate (Vistaril) 25 MG capsule, Take 1 capsule by mouth 3 (Three) Times a Day As Needed for Anxiety., Disp: 60 capsule, Rfl: 0    ibuprofen (ADVIL,MOTRIN) 800 MG tablet, TAKE 1 TABLET BY MOUTH EVERY SIX HOURS AS NEEDED FOR mild PAIN, Disp: 120 tablet, Rfl: 0    levonorgestrel (KYLEENA) 19.5 MG intrauterine device IUD, 1 each by Intrauterine route 1 (One) Time., Disp: , Rfl:     meclizine (ANTIVERT) 25 MG tablet, Take 1 tablet by mouth 3 (Three) Times a Day As Needed for Dizziness., Disp: 90 tablet, Rfl: 0    methocarbamol (ROBAXIN) 500 MG tablet, TAKE 1 TABLET BY MOUTH TWO TIMES A DAY AS NEEDED FOR MUSCLE SPASMS, Disp: 30 tablet, Rfl: 0    ondansetron (Zofran) 4 MG tablet, Take 1 tablet by mouth Every 8 (Eight) Hours As Needed for Nausea or Vomiting., Disp: 30 tablet, Rfl: 1    prochlorperazine (COMPAZINE) 10 MG tablet, Take 1 tablet by mouth Every 6 (Six) Hours As Needed for Nausea or Vomiting (and migraine)., Disp: 20 tablet, Rfl: 0    promethazine (PHENERGAN) 12.5 MG tablet, Take 1 tablet by mouth Every 6 (Six) Hours As Needed for Nausea or Vomiting., Disp: 40 tablet, Rfl: 0    buPROPion XL (Wellbutrin XL) 150 MG 24 hr tablet, Take 1 tablet by mouth Every Morning., Disp: 30 tablet, Rfl: 1    busPIRone (BUSPAR) 10 MG tablet, 1/2 tab po tid and may increase by 5mg/day q2-3 days prn to max of  30mg/ day, Disp: 90 tablet, Rfl: 0    metoprolol succinate XL (Toprol XL) 25 MG 24 hr tablet, Take 1 tablet by mouth At Night As Needed (palpitations)., Disp: 30 tablet, Rfl: 0    zaleplon (SONATA) 10 MG capsule, 1 po qhs prn insomnia and may repeat x 1 if >4 hrs left to sleep, Disp: 40 capsule, Rfl: 0    Past Medical History:   Diagnosis Date    Angioedema     Anxiety     Asthma     Headache     Irritable bowel syndrome     Kidney stone 06/02/2023    Neck pain     Panic attacks     PAP     NEG = 2020      JFW    Schwannoma     Seasonal depression     Visual impairment     computer/ studying only       Past Surgical History:   Procedure Laterality Date    BREAST SURGERY Bilateral 2015    Silicone    EAR TUBES  1997    GAMMA KNIFE RESECTION OF SCHWANNOMA  02/20/2017     tumor from post c-spine    SPINE SURGERY  2/20/2017    Dermoid fibroma removal of cervical spine    WISDOM TOOTH EXTRACTION  2013       Family History   Problem Relation Age of Onset    Hypertension Mother     Cancer Mother         BCC    Hypertension Father     Other Father         GLAUCOMA    Vision loss Father         Glaucoma    Asthma Sister     Other Sister         ADD    Raynaud syndrome Sister     Heart disease Maternal Grandmother         CVA x3    Mental illness Maternal Grandmother         Schizophrenia    Stroke Maternal Grandmother     Cancer Maternal Grandfather         Skin Cancer    Cancer Paternal Grandmother         Skin, Breast and Lung Cancer    Heart disease Paternal Grandmother         MI- cardiac arrest    Kidney disease Paternal Grandmother         CKD stage 3    Heart disease Paternal Grandfather         MI- cardiac arrest    Heart disease Other         Grandparents       Social History     Socioeconomic History    Marital status:    Tobacco Use    Smoking status: Every Day     Types: Electronic Cigarette     Start date: 2020    Smokeless tobacco: Never    Tobacco comments:     1/2 ppweek   Vaping Use    Vaping Use:  Every day    Start date: 1/1/2020    Substances: Nicotine    Devices: Disposable   Substance and Sexual Activity    Alcohol use: Yes     Alcohol/week: 3.0 standard drinks     Types: 3 Cans of beer per week     Comment: 5    Drug use: No    Sexual activity: Yes     Partners: Male     Birth control/protection: I.U.D.     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 admits she has been having a lot of issues w/ her anxiety as well    Pt states she exercises/ limits her caffeine to 2/day and even tried some online therapy...... the therapist feels she needs to see psych for poss anti-depressant med     The patient is only using fluticasone-salmeterol (Advair) when she absolutely needs it. She had stopped using the sertraline (Zoloft). She notes that the 100 mg trazodone (Desyrel) causes her to be orthostatic. She had been switched from buspirone (Buspar) one tablet 3 times daily to one tablet once daily.    The patient states that she prefers to have a higher dose of the buspirone (Buspar), as once daily is not effective enough. She states that she started doing online counseling. The counselor believes that she needs to be on an antidepressant, so she is being recommended to see a psychiatrist. She has not found a psychiatrist yet. She called the office of Dr. Yokasta Zaman, Psychiatry, whose schedule is full until 09/2023. She herself does not believe that she needs a psychiatrist necessarily. She prefers to be on a medication that can help her sleep at night and have her anxiety under control. She believes that she will not be depressed if she will have her anxiety under control. She sent a message to this clinic on 08/09/2022. She was then sent a prescription order for buspirone (Buspar) and was instructed to make a follow-up in 1 month. 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    The patient's medication for her headaches has been effective. She notes that she has quite significantly straightened out her headache situation for the most part. She feels that her headaches are triggered by the weather. She had lesser migraines after being on fremanezumab-vfrm (Ajovy) for 1.5 years, so she was taken off of it. For the headaches she has been having occasionally, she will take methocarbamol (Robaxin) and aspirin-acetaminophen-caffeine (Excedrin Migraine).  The rizatriptan benzoate (Maxalt) and the sumatriptan (Imitrex) had caused her to feel that she was choking to death as well as caused her to have extreme tightness in her chest. The ubrogepant (Ubrelvy) would decrease the severity of her headaches; however, if she did not take the ubrogepant (Ubrelvy) in the first 5 minutes of onset of headaches, it is almost as if it had no effect. She notes that she had tried the imegepant (Nurtec ODT) when it first came out. The trazodone (Desyrel) caused her to have nasal congestion and difficulty sleeping. She could not take that trazodone (Desyrel) with cetirizine (Zyrtec) and diphenhydramine (Benadryl). The diphenhydramine (Benadryl) taken by itself has been effective for her. She hopes she can be taken off diphenhydramine (Benadryl) once her blood pressure is under control.     She has just been sleeping probably maybe 3 to 4 hours. Every night, she will have difficulty sleeping and she wakes up multiple times throughout the night. She has not tried zaleplon (Sonata) or other antidepressants other than the trazodone (Desyrel) to aid with sleep.    The patient inquires when she can be suggested for her palpitations. She notes that she is checking her heart rate manually. She is not actually becoming tachycardic \"some of the time;\" however, she will experience " "shortness of breath. She already had labs done.   She drinks 1 soft drink a day. She drinks 1 cup of coffee in the morning only on the days when she is working. She states that the hydroxyzine (Atarax) generally seems effective for the palpitations.    The patient states that she used to never be anxious and \"did not have a care in the world.\" She does activities such as Yoga in the morning and meditation.  She went for therapy, which she would not normally do. She notes that something is going on \"with just life.\"     The patient took sertraline (Zoloft) 25 mg before from 6th Grade until Sophomore and Lb year, which had been \"perfectly fine.\" She was taken off the sertraline (Zoloft) per her pediatrician's recommendation. At one point, she had been tried on sertraline (Zoloft) once again for seasonal depression; however, it caused her to be very suicidal once she started taking it. She used to take the sertraline (Zoloft) when she was taking the topiramate (Topamax) as well. She had already been taking topiramate (Topamax) for quite a period of time. She had also tried amitriptyline, which she states she may try taking again as she still has some left.    The patient's problem is she is doing everything she believes can help her anxiety and nothing is helping. She states that she drinks her last caffeine at 3:00 PM.    The following portions of the patient's history were reviewed and updated as appropriate: allergies, current medications, past family history, past medical history, past social history, past surgical history and problem list.    Review of Systems   Constitutional:  Positive for activity change and fatigue. Negative for appetite change, unexpected weight gain and unexpected weight loss.   Respiratory:  Negative for cough, shortness of breath and wheezing.    Cardiovascular:  Positive for palpitations. Negative for chest pain.   Gastrointestinal:  Positive for nausea. Negative for constipation, " diarrhea, vomiting and GERD.   Neurological:  Positive for headache.   Psychiatric/Behavioral:  Positive for sleep disturbance, depressed mood and stress. Negative for decreased concentration and negative for hyperactivity. The patient is nervous/anxious.      Vitals:    06/09/23 1442   BP: 107/74   Pulse: 99   Resp: 16   Temp: 98.2 °F (36.8 °C)   SpO2: 99%       Objective   Physical Exam  Vitals and nursing note reviewed.   Constitutional:       General: She is not in acute distress.     Appearance: She is not ill-appearing or toxic-appearing.   HENT:      Head: Normocephalic and atraumatic.   Pulmonary:      Effort: Pulmonary effort is normal.   Neurological:      Mental Status: She is alert and oriented to person, place, and time.      Cranial Nerves: No cranial nerve deficit.   Psychiatric:         Attention and Perception: Attention and perception normal.         Mood and Affect: Mood and affect normal.         Speech: Speech normal.         Behavior: Behavior normal. Behavior is cooperative.         Thought Content: Thought content normal.         Cognition and Memory: Cognition and memory normal.         Judgment: Judgment normal.       BMI is within normal parameters. No other follow-up for BMI required.      Assessment & Plan   Diagnoses and all orders for this visit:    1. Anxiety (Primary)    2. Seasonal depression    3. Adjustment insomnia  -     zaleplon (SONATA) 10 MG capsule; 1 po qhs prn insomnia and may repeat x 1 if >4 hrs left to sleep  Dispense: 40 capsule; Refill: 0    4. Chronic migraine without aura without status migrainosus, not intractable    5. Encounter for screening for lipid disorder  -     Lipid Panel; Future    6. Elevated liver enzymes  -     Comprehensive Metabolic Panel; Future    7. Palpitations    Other orders  -     busPIRone (BUSPAR) 10 MG tablet; 1/2 tab po tid and may increase by 5mg/day q2-3 days prn to max of 30mg/ day  Dispense: 90 tablet; Refill: 0  -     metoprolol  succinate XL (Toprol XL) 25 MG 24 hr tablet; Take 1 tablet by mouth At Night As Needed (palpitations).  Dispense: 30 tablet; Refill: 0  -     buPROPion XL (Wellbutrin XL) 150 MG 24 hr tablet; Take 1 tablet by mouth Every Morning.  Dispense: 30 tablet; Refill: 1  -     hydrOXYzine pamoate (Vistaril) 25 MG capsule; Take 1 capsule by mouth 3 (Three) Times a Day As Needed for Anxiety.  Dispense: 60 capsule; Refill: 0        - The patient is advised the use of zaleplon (Sonata), which is a 4-hour sleep aid.    - The patient is advised that she can start out w/ buspirone (Buspar) 5 mg three times a day, and can increase by 5 mg per day every 2 to 3 days with maximum dose of 60 mg daily.  -   - The patient will be sent a prescription order of bupropion XL (Wellbutrin XL),   -   - The patient will be sent a prescription order of metoprolol succinate XL (Toprol XL) to be taken at night as needed for palpitations.  - The patient will be sent a prescription order of zaleplon (Sonata) to be taken at night as needed.    - The patient is advised to not drink caffeine in the afternoon.       Transcribed from ambient dictation for Rupali Abdalla DO by Liborio Ch.  06/09/23   22:05 EDT    Patient or patient representative verbalized consent to the visit recording.  I have personally performed the services described in this document as transcribed by the above individual, and it is both accurate and complete.

## 2023-06-14 ENCOUNTER — CLINICAL SUPPORT (OUTPATIENT)
Dept: FAMILY MEDICINE CLINIC | Facility: CLINIC | Age: 28
End: 2023-06-14
Payer: COMMERCIAL

## 2023-06-14 DIAGNOSIS — R74.8 ELEVATED LIVER ENZYMES: ICD-10-CM

## 2023-06-14 DIAGNOSIS — Z13.220 ENCOUNTER FOR SCREENING FOR LIPID DISORDER: ICD-10-CM

## 2023-06-14 PROCEDURE — 80061 LIPID PANEL: CPT | Performed by: FAMILY MEDICINE

## 2023-06-14 PROCEDURE — 36415 COLL VENOUS BLD VENIPUNCTURE: CPT | Performed by: FAMILY MEDICINE

## 2023-06-14 PROCEDURE — 80053 COMPREHEN METABOLIC PANEL: CPT | Performed by: FAMILY MEDICINE

## 2023-06-14 NOTE — PROGRESS NOTES
Venipuncture performed in L ARM by RT Amira  with good hemostasis. Patient tolerated well. 06/14/23 Yamila Das, RT

## 2023-06-15 LAB
ALBUMIN SERPL-MCNC: 4.6 G/DL (ref 3.5–5.2)
ALBUMIN/GLOB SERPL: 1.8 G/DL
ALP SERPL-CCNC: 63 U/L (ref 39–117)
ALT SERPL W P-5'-P-CCNC: 11 U/L (ref 1–33)
ANION GAP SERPL CALCULATED.3IONS-SCNC: 12.4 MMOL/L (ref 5–15)
AST SERPL-CCNC: 19 U/L (ref 1–32)
BILIRUB SERPL-MCNC: 1.2 MG/DL (ref 0–1.2)
BUN SERPL-MCNC: 7 MG/DL (ref 6–20)
BUN/CREAT SERPL: 8.5 (ref 7–25)
CALCIUM SPEC-SCNC: 9.4 MG/DL (ref 8.6–10.5)
CHLORIDE SERPL-SCNC: 107 MMOL/L (ref 98–107)
CHOLEST SERPL-MCNC: 129 MG/DL (ref 0–200)
CO2 SERPL-SCNC: 21.6 MMOL/L (ref 22–29)
CREAT SERPL-MCNC: 0.82 MG/DL (ref 0.57–1)
EGFRCR SERPLBLD CKD-EPI 2021: 101.3 ML/MIN/1.73
GLOBULIN UR ELPH-MCNC: 2.5 GM/DL
GLUCOSE SERPL-MCNC: 73 MG/DL (ref 65–99)
HDLC SERPL-MCNC: 63 MG/DL (ref 40–60)
LDLC SERPL CALC-MCNC: 58 MG/DL (ref 0–100)
LDLC/HDLC SERPL: 0.96 {RATIO}
POTASSIUM SERPL-SCNC: 4.1 MMOL/L (ref 3.5–5.2)
PROT SERPL-MCNC: 7.1 G/DL (ref 6–8.5)
SODIUM SERPL-SCNC: 141 MMOL/L (ref 136–145)
TRIGL SERPL-MCNC: 29 MG/DL (ref 0–150)
VLDLC SERPL-MCNC: 8 MG/DL (ref 5–40)

## 2023-07-19 PROBLEM — J06.9 URI (UPPER RESPIRATORY INFECTION): Status: RESOLVED | Noted: 2022-11-30 | Resolved: 2023-07-19

## 2023-09-25 NOTE — TELEPHONE ENCOUNTER
Pt states the meds the on call dr olivera have really helped, and she is going to finished th medrol dosepak. The migraine has subsided and she only has this tension in her neck still. 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 Says she was able to get appt with Neuro later this month.     States she's already tried Ubrelvy in the past and it did not work.    Spoke with pt.  She will have her xrays done tomorrow at [facility].  Scheduled pt with Janet Murry PA-C on Thursday at 230 pm

## 2023-10-25 RX ORDER — BUSPIRONE HYDROCHLORIDE 10 MG/1
TABLET ORAL
Qty: 225 TABLET | Refills: 0 | Status: SHIPPED | OUTPATIENT
Start: 2023-10-25

## 2023-10-25 NOTE — TELEPHONE ENCOUNTER
Rx Refill Note  Requested Prescriptions     Pending Prescriptions Disp Refills    busPIRone (BUSPAR) 10 MG tablet [Pharmacy Med Name: BUSPIRONE 10MG TABLETS] 225 tablet 0     Sig: TAKE 1 TABLET BY MOUTH EVERY MORNING, 1/2 TABLET EVERY EVENING, AND 1 TABLET EVERY NIGHT AT BEDTIME      Last office visit with prescribing clinician: 7/19/2023   Last telemedicine visit with prescribing clinician: Visit date not found   Next office visit with prescribing clinician: Visit date not found     Comprehensive Metabolic Panel (06/14/2023 08:47)   Lipid Panel (06/14/2023 08:47)                     Would you like a call back once the refill request has been completed: [] Yes [] No    If the office needs to give you a call back, can they leave a voicemail: [] Yes [] No    Milena Ibrahim CMA  10/25/23, 07:56 EDT

## 2024-02-01 DIAGNOSIS — F51.02 ADJUSTMENT INSOMNIA: ICD-10-CM

## 2024-02-02 RX ORDER — BUSPIRONE HYDROCHLORIDE 10 MG/1
TABLET ORAL
Qty: 225 TABLET | Refills: 0 | Status: SHIPPED | OUTPATIENT
Start: 2024-02-02

## 2024-02-02 RX ORDER — ZALEPLON 10 MG/1
CAPSULE ORAL
Qty: 40 CAPSULE | Refills: 0 | Status: SHIPPED | OUTPATIENT
Start: 2024-02-02

## 2024-02-02 NOTE — TELEPHONE ENCOUNTER
[facility]    Rx Refill Note  Requested Prescriptions     Pending Prescriptions Disp Refills    zaleplon (SONATA) 10 MG capsule [Pharmacy Med Name: ZALEPLON 10MG CAPSULES] 40 capsule      Sig: TAKE 1 CAPSULE BY MOUTH EVERY NIGHT AT BEDTIME AS NEEDED FOR INSOMNIA. MAY REPEAT 1 TIME IF>4 HOURS LEFT TO SLEEP      Last office visit with prescribing clinician: 7/19/2023   Last telemedicine visit with prescribing clinician: Visit date not found   Next office visit with prescribing clinician: 2/1/2024                         Would you like a call back once the refill request has been completed: [] Yes [] No    If the office needs to give you a call back, can they leave a voicemail: [] Yes [] No    Yamila Das, RT  02/02/24, 10:08 EST

## 2024-04-22 RX ORDER — BUSPIRONE HYDROCHLORIDE 10 MG/1
TABLET ORAL
Qty: 225 TABLET | Refills: 0 | Status: SHIPPED | OUTPATIENT
Start: 2024-04-22

## 2024-05-21 RX ORDER — BUPROPION HYDROCHLORIDE 150 MG/1
150 TABLET ORAL EVERY MORNING
Qty: 90 TABLET | Refills: 1 | Status: SHIPPED | OUTPATIENT
Start: 2024-05-21

## 2024-05-21 NOTE — TELEPHONE ENCOUNTER
Rx Refill Note  Requested Prescriptions     Pending Prescriptions Disp Refills    buPROPion XL (WELLBUTRIN XL) 150 MG 24 hr tablet [Pharmacy Med Name: BUPROPION XL 150MG TABLETS (24 H)] 90 tablet 1     Sig: TAKE 1 TABLET BY MOUTH EVERY MORNING      Last office visit with prescribing clinician: 7/19/2023   Last telemedicine visit with prescribing clinician: Visit date not found   Next office visit with prescribing clinician: Visit date not found                         Would you like a call back once the refill request has been completed: [] Yes [] No    If the office needs to give you a call back, can they leave a voicemail: [] Yes [] No    Milena Barfield, [facility]  05/21/24, 08:51 EDT

## 2024-06-18 ENCOUNTER — LAB (OUTPATIENT)
Dept: LAB | Facility: HOSPITAL | Age: 29
End: 2024-06-18
Payer: COMMERCIAL

## 2024-06-18 DIAGNOSIS — R30.0 DYSURIA: Primary | ICD-10-CM

## 2024-06-18 DIAGNOSIS — R30.0 DYSURIA: ICD-10-CM

## 2024-06-18 LAB
BACTERIA UR QL AUTO: NORMAL /HPF
BILIRUB UR QL STRIP: NEGATIVE
CLARITY UR: CLEAR
COLOR UR: ABNORMAL
GLUCOSE UR STRIP-MCNC: NEGATIVE MG/DL
HGB UR QL STRIP.AUTO: NEGATIVE
HYALINE CASTS UR QL AUTO: NORMAL /LPF
KETONES UR QL STRIP: NEGATIVE
LEUKOCYTE ESTERASE UR QL STRIP.AUTO: NEGATIVE
NITRITE UR QL STRIP: POSITIVE
PH UR STRIP.AUTO: 7 [PH] (ref 5–8)
PROT UR QL STRIP: NEGATIVE
RBC # UR STRIP: NORMAL /HPF
REF LAB TEST METHOD: NORMAL
SP GR UR STRIP: <=1.005 (ref 1–1.03)
SQUAMOUS #/AREA URNS HPF: NORMAL /HPF
UROBILINOGEN UR QL STRIP: ABNORMAL
WBC # UR STRIP: NORMAL /HPF

## 2024-06-18 PROCEDURE — 87086 URINE CULTURE/COLONY COUNT: CPT

## 2024-06-18 PROCEDURE — 81001 URINALYSIS AUTO W/SCOPE: CPT

## 2024-06-18 RX ORDER — NITROFURANTOIN 25; 75 MG/1; MG/1
100 CAPSULE ORAL 2 TIMES DAILY
Qty: 10 CAPSULE | Refills: 0 | Status: SHIPPED | OUTPATIENT
Start: 2024-06-18 | End: 2024-06-18

## 2024-06-18 RX ORDER — NITROFURANTOIN 25; 75 MG/1; MG/1
100 CAPSULE ORAL 2 TIMES DAILY
Qty: 10 CAPSULE | Refills: 0 | Status: SHIPPED | OUTPATIENT
Start: 2024-06-18 | End: 2024-06-23

## 2024-06-19 LAB — BACTERIA SPEC AEROBE CULT: NO GROWTH

## 2024-07-01 DIAGNOSIS — F51.02 ADJUSTMENT INSOMNIA: ICD-10-CM

## 2024-07-02 RX ORDER — BUPROPION HYDROCHLORIDE 150 MG/1
150 TABLET ORAL EVERY MORNING
Qty: 30 TABLET | OUTPATIENT
Start: 2024-07-02

## 2024-07-02 RX ORDER — ZALEPLON 10 MG/1
CAPSULE ORAL
Qty: 40 CAPSULE | OUTPATIENT
Start: 2024-07-02

## 2024-07-02 NOTE — TELEPHONE ENCOUNTER
[facility]    Rx Refill Note  Requested Prescriptions     Pending Prescriptions Disp Refills    buPROPion XL (WELLBUTRIN XL) 150 MG 24 hr tablet [Pharmacy Med Name: BUPROPION XL 150MG TABLETS (24 H)] 30 tablet      Sig: TAKE 1 TABLET BY MOUTH EVERY MORNING    zaleplon (SONATA) 10 MG capsule [Pharmacy Med Name: ZALEPLON 10MG CAPSULES] 40 capsule      Sig: TAKE 1 CAPSULE BY MOUTH EVERY NIGHT AT BEDTIME AS NEEDED FOR INSOMNIA. MAY REPEAT 1 TIME IF>4 HOURS LEFT TO SLEEP      Last office visit with prescribing clinician: 7/19/2023   Last telemedicine visit with prescribing clinician: Visit date not found   Next office visit with prescribing clinician: Visit date not found                         Would you like a call back once the refill request has been completed: [] Yes [] No    If the office needs to give you a call back, can they leave a voicemail: [] Yes [] No    Yamila Das, RT  07/02/24, 11:29 EDT

## 2024-07-11 ENCOUNTER — OFFICE VISIT (OUTPATIENT)
Dept: FAMILY MEDICINE CLINIC | Facility: CLINIC | Age: 29
End: 2024-07-11
Payer: COMMERCIAL

## 2024-07-11 VITALS
WEIGHT: 106 LBS | RESPIRATION RATE: 12 BRPM | HEIGHT: 60 IN | DIASTOLIC BLOOD PRESSURE: 68 MMHG | SYSTOLIC BLOOD PRESSURE: 103 MMHG | BODY MASS INDEX: 20.81 KG/M2 | TEMPERATURE: 97.8 F | HEART RATE: 82 BPM | OXYGEN SATURATION: 100 %

## 2024-07-11 DIAGNOSIS — J45.20 MILD INTERMITTENT ASTHMA WITHOUT COMPLICATION: ICD-10-CM

## 2024-07-11 DIAGNOSIS — G43.109 MIGRAINE WITH AURA AND WITHOUT STATUS MIGRAINOSUS, NOT INTRACTABLE: ICD-10-CM

## 2024-07-11 DIAGNOSIS — F41.9 ANXIETY: Primary | ICD-10-CM

## 2024-07-11 DIAGNOSIS — Z83.3 FAMILY HISTORY OF DIABETES MELLITUS: ICD-10-CM

## 2024-07-11 DIAGNOSIS — R11.0 NAUSEA: ICD-10-CM

## 2024-07-11 DIAGNOSIS — Z13.220 SCREENING FOR LIPID DISORDERS: ICD-10-CM

## 2024-07-11 PROCEDURE — 99214 OFFICE O/P EST MOD 30 MIN: CPT | Performed by: FAMILY MEDICINE

## 2024-07-11 RX ORDER — METAXALONE 800 MG/1
800 TABLET ORAL 3 TIMES DAILY PRN
Qty: 20 TABLET | Refills: 0 | Status: SHIPPED | OUTPATIENT
Start: 2024-07-11

## 2024-07-11 RX ORDER — PRENATAL VIT 49/IRON FUM/FOLIC 6.75-0.2MG
1 TABLET ORAL DAILY
Start: 2024-07-11

## 2024-07-11 RX ORDER — PROMETHAZINE HYDROCHLORIDE 12.5 MG/1
12.5 TABLET ORAL EVERY 6 HOURS PRN
Qty: 40 TABLET | Refills: 0 | Status: SHIPPED | OUTPATIENT
Start: 2024-07-11

## 2024-07-11 RX ORDER — BUSPIRONE HYDROCHLORIDE 10 MG/1
TABLET ORAL
Qty: 225 TABLET | Refills: 0 | Status: SHIPPED | OUTPATIENT
Start: 2024-07-11

## 2024-07-11 RX ORDER — HYDROXYZINE PAMOATE 25 MG/1
25 CAPSULE ORAL 3 TIMES DAILY PRN
Qty: 60 CAPSULE | Refills: 1 | Status: SHIPPED | OUTPATIENT
Start: 2024-07-11

## 2024-07-11 RX ORDER — PROCHLORPERAZINE MALEATE 10 MG
10 TABLET ORAL EVERY 6 HOURS PRN
Qty: 30 TABLET | Refills: 0 | Status: SHIPPED | OUTPATIENT
Start: 2024-07-11

## 2024-07-11 NOTE — PROGRESS NOTES
Answers submitted by the patient for this visit:  Other (Submitted on 7/9/2024)  Please describe your symptoms.: F/U for med refill. , Would like to have labs checked for yearly exam. , Questions about infertility  Have you had these symptoms before?: No  How long have you been having these symptoms?: 1-4 days  Primary Reason for Visit (Submitted on 7/9/2024)  What is the primary reason for your visit?: Other  Subjective   Argenis Ruffin is a 28 y.o. female.     Chief Complaint   Patient presents with    Depression, unspecified depression type    Anxiety    Insomnia         Current Outpatient Medications:     albuterol sulfate  (90 Base) MCG/ACT inhaler, Inhale 2 puffs Every 4 (Four) Hours As Needed., Disp: , Rfl:     aspirin-acetaminophen-caffeine (EXCEDRIN MIGRAINE) 250-250-65 MG per tablet, PRN, Disp: , Rfl:     buPROPion XL (WELLBUTRIN XL) 150 MG 24 hr tablet, TAKE 1 TABLET BY MOUTH EVERY MORNING, Disp: 90 tablet, Rfl: 1    busPIRone (BUSPAR) 10 MG tablet, TAKE 1 TABLET BY MOUTH EVERY MORNING AND 1/2 TABLET BY MOUTH EVERY EVENING AND 1 TABLET BY MOUTH EVERY NIGHT AT BEDTIME, Disp: 225 tablet, Rfl: 0    fluticasone-salmeterol (ADVAIR) 100-50 MCG/DOSE DISKUS, Inhale 1 puff 2 (Two) Times a Day. (Patient taking differently: Inhale 1 puff 2 (Two) Times a Day. During the winter), Disp: 60 each, Rfl: 1    hydrOXYzine pamoate (Vistaril) 25 MG capsule, Take 1 capsule by mouth 3 (Three) Times a Day As Needed for Anxiety., Disp: 60 capsule, Rfl: 1    meclizine (ANTIVERT) 25 MG tablet, Take 1 tablet by mouth 3 (Three) Times a Day As Needed for Dizziness., Disp: 90 tablet, Rfl: 0    prochlorperazine (COMPAZINE) 10 MG tablet, Take 1 tablet by mouth Every 6 (Six) Hours As Needed for Nausea or Vomiting (and migraine)., Disp: 30 tablet, Rfl: 0    promethazine (PHENERGAN) 12.5 MG tablet, Take 1 tablet by mouth Every 6 (Six) Hours As Needed for Nausea or Vomiting., Disp: 40 tablet, Rfl: 0    zaleplon (SONATA) 10 MG  capsule, TAKE 1 CAPSULE BY MOUTH EVERY NIGHT AT BEDTIME AS NEEDED FOR INSOMNIA. MAY REPEAT 1 TIME IF>4 HOURS LEFT TO SLEEP, Disp: 40 capsule, Rfl: 0    metaxalone (Skelaxin) 800 MG tablet, Take 1 tablet by mouth 3 (Three) Times a Day As Needed for Muscle Spasms., Disp: 20 tablet, Rfl: 0    Prenatal 6.75-0.2 MG tablet, Take 1 tablet by mouth Daily., Disp: , Rfl:     Past Medical History:   Diagnosis Date    Angioedema     x 2    Anxiety     Asthma     Headache     IBS     Kidney stone 06/02/2023    Neck pain     Panic attacks     PAP     NEG = 2020/ABN, NML= 2021/ ABN= 2022/ NEG= 2023    Schwannoma     Seasonal depression     Visual impairment     computer/ studying only       Past Surgical History:   Procedure Laterality Date    BREAST SURGERY Bilateral 2015    Silicone    COLPOSCOPY  2022    NEG = Bx    EAR TUBES  1997    GAMMA KNIFE RESECTION OF SCHWANNOMA  02/20/2017     tumor from post c-spine    SPINE SURGERY  2/20/2017    Dermoid fibroma removal of cervical spine    WISDOM TOOTH EXTRACTION  2013       Family History   Problem Relation Age of Onset    Skin cancer Mother     Basal cell carcinoma Mother         shoulder    ADD / ADHD Mother     Hypertension Mother     Hypertension Father     Glaucoma Father     Arthritis Father     Asthma Sister     Raynaud syndrome Sister     ADD / ADHD Sister     Heart disease Maternal Grandmother         CVA x3    Stroke Maternal Grandmother     Schizophrenia Maternal Grandmother     Skin cancer Maternal Grandfather     Cancer Maternal Grandfather         Skin Cancer    Breast cancer Paternal Grandmother     Cancer Paternal Grandmother         Skin, Breast and Lung Cancer    Heart disease Paternal Grandmother         MI- cardiac arrest    Kidney disease Paternal Grandmother         CKD stage 3    Lung cancer Paternal Grandmother     Skin cancer Paternal Grandmother     Heart disease Paternal Grandfather         MI- cardiac arrest    Heart disease Other         Grandparents        Social History     Socioeconomic History    Marital status:    Tobacco Use    Smoking status: Former     Current packs/day: 0.00     Types: Cigarettes     Quit date: 2021     Years since quittin.6     Passive exposure: Current    Smokeless tobacco: Never   Vaping Use    Vaping status: Every Day    Start date: 2020    Substances: Nicotine, 1 cartridge q2-3weeks    Devices: Disposable   Substance and Sexual Activity    Alcohol use: Yes     Alcohol/week: 6.0 standard drinks of alcohol     Types: 6 Cans of beer per week    Drug use: No    Sexual activity: Yes     Partners: Male     Birth control/protection: I.U.D.     Comment: Bryan       History of Present Illness  The patient is a 28-year-old  female here for follow-up on insomnia. She is on Wellbutrin for depression and anxiety.    She experiences throat tightening, similar to an anaphylactic reaction, and is uncertain if this is a side effect of her medication. She has tried various medications for headaches, including sumatriptan, rizatriptan, Ubrelvy, and Nurtec. She prefers Ubrelvy over Ajovy, which significantly reduced her migraines after 6 months. She experiences migraines twice a month, which are particularly severe. Her migraines originate from her neck from a previous surgery, causing constant pain in her shoulders and ears. She finds cyclobenzaprine more effective than Robaxin, prescribed by her neurologist, Dr. Aguirre, when she had a positive pregnancy test. She uses muscle relaxers a few times a week for rescue migraines.    Her intrauterine device (IUD) has been removed as she has been trying to conceive for several months without success. She has consulted with Dr Griffiths, a gynecologist, twice at [facility], but they will not allow her to follow up until she has tried x 1 yr. She inquired about discontinuing Wellbutrin if she becomes pregnant. She has an appointment with her gynecologist on 2024. She is not  ovulating despite testing daily.     She takes BuSpar 10 mg in the morning, 5 mg in the middle of the day, and 10 mg at night, which makes her sleepy for 30 minutes. She uses Advair as needed during the winter and does not believe she needs it as she feels well. She takes hydroxyzine fairly frequently, which does not make her tired. She takes meclizine for vertigo as needed, which she has had before. Her last episode of vertigo was 2 months ago.     She had 3 sets of ear tubes as a child, and then a vestibular schwannoma removed. She constantly feels nauseous. She takes Zofran, Compazine, and Phenergan 12.5 mg, which makes her sleepy. She keeps a rescue inhaler in her hiking bag. She had angioedema in her lips with gadolinium during her neck surgery, which happened a couple of times when she was in high school or shortly thereafter. She responded to IV Benadryl and steroids, and drove herself to an urgent care. She felt better after receiving IV Benadryl and steroids.     She has not had any new surgeries. She had a colposcopy performed by Dr. Mahoney  in 2022 or 2023 due to abnormal Pap smears. She takes Women's Plus Daily Prenatal Folic acid daily. She takes Sonata 1 tablet at night if she can not sleep. Her counselor suggested she might have bipolar disorder and recommended she see a psychiatrist, but she declined. She exercises regularly.     She quit tobacco in 2021. 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 She vapes 1 cartridges every 2 to 3 weeks. She drinks about 6 beers a week. She does not drink any liquor.   Her father has glaucoma. He is going to have bilateral knee replacement. Her mother and family members have ADHD. Her mother had skin cancer removed from her left shoulder. Her PGM had breast and lung cancer. She has a family history of diabetes. Her father is prediabetic.     She has intolerance to CYMBALTA, GADERINIUM, IMITREX, TRIPTANS, and ZOLOFT.   She gets her influenza vaccine every year. She  has received 2 COVID-19 vaccines.        The following portions of the patient's history were reviewed and updated as appropriate: allergies, current medications, past family history, past medical history, past social history, past surgical history and problem list.    Review of Systems   Constitutional:  Positive for activity change. Negative for appetite change, unexpected weight gain and unexpected weight loss.   Respiratory:  Negative for cough, shortness of breath and wheezing.    Gastrointestinal:  Positive for nausea. Negative for vomiting.   Genitourinary:  Negative for amenorrhea, decreased libido, menstrual problem, pelvic pain, vaginal bleeding, vaginal discharge and vaginal pain.   Musculoskeletal:  Positive for neck pain and neck stiffness.   Neurological:  Positive for headache.   Psychiatric/Behavioral:  Positive for dysphoric mood, sleep disturbance and stress. The patient is nervous/anxious.        Vitals:    07/11/24 0945   BP: 103/68   Pulse: 82   Resp: 12   Temp: 97.8 °F (36.6 °C)   SpO2: 100%       Objective   Physical Exam  Vitals and nursing note reviewed.   Constitutional:       General: She is not in acute distress.     Appearance: She is well-developed and normal weight. She is not ill-appearing or toxic-appearing.   HENT:      Head: Normocephalic and atraumatic.   Cardiovascular:      Rate and Rhythm: Normal rate and regular rhythm.      Heart sounds: Normal heart sounds. No murmur heard.  Pulmonary:      Effort: Pulmonary effort is normal. No respiratory distress.      Breath sounds: Normal breath sounds. No wheezing, rhonchi or rales.   Skin:     General: Skin is warm and dry.      Findings: No rash.   Neurological:      Mental Status: She is alert and oriented to person, place, and time.   Psychiatric:         Attention and Perception: Attention and perception normal.         Mood and Affect: Mood and affect normal.         Speech: Speech normal.         Behavior: Behavior normal.  Behavior is cooperative.         Thought Content: Thought content normal.         Cognition and Memory: Cognition and memory normal.         Judgment: Judgment normal.       Physical Exam       BMI is within normal parameters. No other follow-up for BMI required.      Assessment & Plan   Diagnoses and all orders for this visit:    1. Anxiety (Primary)  -     Comprehensive Metabolic Panel; Future    2. Migraine with aura and without status migrainosus, not intractable    3. Mild intermittent asthma without complication    4. Nausea    5. Screening for lipid disorders  -     Lipid Panel; Future    6. Family history of diabetes mellitus  -     Comprehensive Metabolic Panel; Future    Other orders  -     metaxalone (Skelaxin) 800 MG tablet; Take 1 tablet by mouth 3 (Three) Times a Day As Needed for Muscle Spasms.  Dispense: 20 tablet; Refill: 0  -     busPIRone (BUSPAR) 10 MG tablet; TAKE 1 TABLET BY MOUTH EVERY MORNING AND 1/2 TABLET BY MOUTH EVERY EVENING AND 1 TABLET BY MOUTH EVERY NIGHT AT BEDTIME  Dispense: 225 tablet; Refill: 0  -     hydrOXYzine pamoate (Vistaril) 25 MG capsule; Take 1 capsule by mouth 3 (Three) Times a Day As Needed for Anxiety.  Dispense: 60 capsule; Refill: 1  -     promethazine (PHENERGAN) 12.5 MG tablet; Take 1 tablet by mouth Every 6 (Six) Hours As Needed for Nausea or Vomiting.  Dispense: 40 tablet; Refill: 0  -     prochlorperazine (COMPAZINE) 10 MG tablet; Take 1 tablet by mouth Every 6 (Six) Hours As Needed for Nausea or Vomiting (and migraine).  Dispense: 30 tablet; Refill: 0  -     Prenatal 6.75-0.2 MG tablet; Take 1 tablet by mouth Daily.      Assessment & Plan  1. Migraines.  Skelaxin 800 mg, 1 to 2 tablets three times a day as needed, has been prescribed.    2. Depression and anxiety.  BuSpar 225 mg has been prescribed. Advair and hydroxyzine have been refilled.    3. Nausea.  Zofran has been discontinued. Compazine and Phenergan 12.5 mg have been prescribed.    4. Asthma.  Advair  and hydroxyzine have been refilled.    5. Health maintenance.  CBC in 2021 was normal. Fasting labs, including CMP and lipid panel, have been ordered.     Results            Patient or patient representative verbalized consent for the use of Ambient Listening during the visit with  Rupali Abdalla DO for chart documentation. 7/28/2024  12:46 EDT

## 2024-07-30 ENCOUNTER — LAB (OUTPATIENT)
Dept: LAB | Facility: HOSPITAL | Age: 29
End: 2024-07-30
Payer: COMMERCIAL

## 2024-07-30 DIAGNOSIS — Z13.220 SCREENING FOR LIPID DISORDERS: ICD-10-CM

## 2024-07-30 DIAGNOSIS — F41.9 ANXIETY: ICD-10-CM

## 2024-07-30 DIAGNOSIS — Z83.3 FAMILY HISTORY OF DIABETES MELLITUS: ICD-10-CM

## 2024-07-30 LAB
ALBUMIN SERPL-MCNC: 5 G/DL (ref 3.5–5.2)
ALBUMIN/GLOB SERPL: 1.7 G/DL
ALP SERPL-CCNC: 69 U/L (ref 39–117)
ALT SERPL W P-5'-P-CCNC: 10 U/L (ref 1–33)
ANION GAP SERPL CALCULATED.3IONS-SCNC: 13.5 MMOL/L (ref 5–15)
AST SERPL-CCNC: 19 U/L (ref 1–32)
BILIRUB SERPL-MCNC: 1.1 MG/DL (ref 0–1.2)
BUN SERPL-MCNC: 8 MG/DL (ref 6–20)
BUN/CREAT SERPL: 9.8 (ref 7–25)
CALCIUM SPEC-SCNC: 9.4 MG/DL (ref 8.6–10.5)
CHLORIDE SERPL-SCNC: 102 MMOL/L (ref 98–107)
CHOLEST SERPL-MCNC: 159 MG/DL (ref 0–200)
CO2 SERPL-SCNC: 23.5 MMOL/L (ref 22–29)
CREAT SERPL-MCNC: 0.82 MG/DL (ref 0.57–1)
EGFRCR SERPLBLD CKD-EPI 2021: 100.7 ML/MIN/1.73
GLOBULIN UR ELPH-MCNC: 2.9 GM/DL
GLUCOSE SERPL-MCNC: 79 MG/DL (ref 65–99)
HDLC SERPL-MCNC: 76 MG/DL (ref 40–60)
LDLC SERPL CALC-MCNC: 69 MG/DL (ref 0–100)
LDLC/HDLC SERPL: 0.91 {RATIO}
POTASSIUM SERPL-SCNC: 3.9 MMOL/L (ref 3.5–5.2)
PROT SERPL-MCNC: 7.9 G/DL (ref 6–8.5)
SODIUM SERPL-SCNC: 139 MMOL/L (ref 136–145)
TRIGL SERPL-MCNC: 71 MG/DL (ref 0–150)
VLDLC SERPL-MCNC: 14 MG/DL (ref 5–40)

## 2024-07-30 PROCEDURE — 80053 COMPREHEN METABOLIC PANEL: CPT

## 2024-07-30 PROCEDURE — 80061 LIPID PANEL: CPT

## 2024-07-30 PROCEDURE — 36415 COLL VENOUS BLD VENIPUNCTURE: CPT

## 2024-11-12 RX ORDER — METHOCARBAMOL 500 MG/1
TABLET, FILM COATED ORAL
Qty: 30 TABLET | Refills: 0 | OUTPATIENT
Start: 2024-11-12

## 2024-11-22 ENCOUNTER — TELEPHONE (OUTPATIENT)
Dept: FAMILY MEDICINE CLINIC | Facility: CLINIC | Age: 29
End: 2024-11-22

## 2024-11-22 NOTE — TELEPHONE ENCOUNTER
Caller: Argenis Ruffin     Relationship: [unfilled]     Best call back number:    [phone]       What is your medical concern?   PATIENT HAS CONSTANT UTI.  IS THERE ANYTHING THAT CAN BE DONE TO PREVENT THEM?  SHE IS CURRENTLY ON AN ANTIBIOTIC.     How long has this issue been going on? NA    Is your provider already aware of this issue? NA    Have you been treated for this issue? YES

## 2024-11-25 DIAGNOSIS — N39.0 RECURRENT UTI: Primary | ICD-10-CM

## 2024-12-10 ENCOUNTER — PATIENT MESSAGE (OUTPATIENT)
Dept: FAMILY MEDICINE CLINIC | Facility: CLINIC | Age: 29
End: 2024-12-10
Payer: COMMERCIAL

## 2025-01-13 RX ORDER — BUPROPION HYDROCHLORIDE 150 MG/1
150 TABLET ORAL EVERY MORNING
Qty: 90 TABLET | Refills: 1 | Status: SHIPPED | OUTPATIENT
Start: 2025-01-13

## 2025-01-31 ENCOUNTER — TELEPHONE (OUTPATIENT)
Dept: FAMILY MEDICINE CLINIC | Facility: CLINIC | Age: 30
End: 2025-01-31
Payer: COMMERCIAL

## 2025-01-31 DIAGNOSIS — G43.109 MIGRAINE WITH AURA AND WITHOUT STATUS MIGRAINOSUS, NOT INTRACTABLE: Primary | ICD-10-CM

## 2025-01-31 RX ORDER — METAXALONE 800 MG/1
800 TABLET ORAL 3 TIMES DAILY PRN
Qty: 20 TABLET | Refills: 0 | Status: SHIPPED | OUTPATIENT
Start: 2025-01-31 | End: 2025-01-31 | Stop reason: SDUPTHER

## 2025-01-31 RX ORDER — METAXALONE 800 MG/1
800 TABLET ORAL 3 TIMES DAILY PRN
Qty: 20 TABLET | Refills: 0 | Status: CANCELLED | OUTPATIENT
Start: 2025-01-31

## 2025-01-31 RX ORDER — ONDANSETRON 4 MG/1
4 TABLET, FILM COATED ORAL EVERY 8 HOURS PRN
Qty: 30 TABLET | Refills: 1 | Status: CANCELLED | OUTPATIENT
Start: 2025-01-31

## 2025-01-31 RX ORDER — METAXALONE 800 MG/1
800 TABLET ORAL 3 TIMES DAILY PRN
Qty: 20 TABLET | Refills: 0 | Status: SHIPPED | OUTPATIENT
Start: 2025-01-31

## 2025-01-31 NOTE — TELEPHONE ENCOUNTER
We received a fax from [facility] in [place] stating that her insurance won't pay for the metaxalone.    Covered preferred alternatives:  Cyclobenzaprine tablet  Tizanidine tablet  Methocarbamol tablet  Carisoprodol tablet  Chlorzoxazon tablet

## 2025-03-06 RX ORDER — BUPROPION HYDROCHLORIDE 150 MG/1
150 TABLET ORAL EVERY MORNING
Qty: 90 TABLET | Refills: 1 | Status: SHIPPED | OUTPATIENT
Start: 2025-03-06

## 2025-03-06 RX ORDER — BUSPIRONE HYDROCHLORIDE 10 MG/1
TABLET ORAL
Qty: 225 TABLET | Refills: 0 | Status: SHIPPED | OUTPATIENT
Start: 2025-03-06

## 2025-03-06 NOTE — TELEPHONE ENCOUNTER
Rx Refill Note  Requested Prescriptions     Pending Prescriptions Disp Refills    buPROPion XL (WELLBUTRIN XL) 150 MG 24 hr tablet [Pharmacy Med Name: BUPROPION XL 150MG TABLETS (24 H)] 90 tablet 1     Sig: TAKE 1 TABLET BY MOUTH EVERY MORNING      Last office visit with prescribing clinician: 7/11/2024   Last telemedicine visit with prescribing clinician: Visit date not found   Next office visit with prescribing clinician: Visit date not found                         Would you like a call back once the refill request has been completed: [] Yes [] No    If the office needs to give you a call back, can they leave a voicemail: [] Yes [] No    Milena Barfield, [facility]  03/06/25, 07:38 EST

## 2025-03-07 ENCOUNTER — TELEPHONE (OUTPATIENT)
Dept: EMERGENCY DEPT | Facility: HOSPITAL | Age: 30
End: 2025-03-07
Payer: COMMERCIAL

## 2025-03-07 DIAGNOSIS — G43.809 VESTIBULAR MIGRAINE: Primary | ICD-10-CM

## 2025-03-07 RX ORDER — PREDNISONE 10 MG/1
TABLET ORAL
Qty: 41 TABLET | Refills: 0 | Status: SHIPPED | OUTPATIENT
Start: 2025-03-07 | End: 2025-03-17

## 2025-03-07 NOTE — TELEPHONE ENCOUNTER
Patient has history of vestibular migraine.  She has a unilateral headache behind the left eye with vertigo.  She was assessed by physical therapy who determined vestibular migraine.  Her history is significant for migraines, vestibular schwannoma.  She has scheduled follow-up with neurology next week that she was encouraged to keep.  A prednisone taper Dosepak was sent to the pharmacy.

## 2025-04-01 RX ORDER — BUPROPION HYDROCHLORIDE 150 MG/1
150 TABLET ORAL EVERY MORNING
Qty: 90 TABLET | Refills: 0 | Status: SHIPPED | OUTPATIENT
Start: 2025-04-01

## 2025-06-03 ENCOUNTER — HOSPITAL ENCOUNTER (OUTPATIENT)
Dept: GENERAL RADIOLOGY | Facility: HOSPITAL | Age: 30
Discharge: HOME | End: 2025-06-03
Admitting: NURSE PRACTITIONER
Payer: COMMERCIAL

## 2025-06-03 ENCOUNTER — TRANSCRIBE ORDERS (OUTPATIENT)
Dept: ADMINISTRATIVE | Facility: HOSPITAL | Age: 30
End: 2025-06-03
Payer: COMMERCIAL

## 2025-06-03 DIAGNOSIS — R06.00 DYSPNEA, UNSPECIFIED TYPE: Primary | ICD-10-CM

## 2025-06-03 DIAGNOSIS — R06.00 DYSPNEA, UNSPECIFIED TYPE: ICD-10-CM

## 2025-06-03 PROCEDURE — 71046 X-RAY EXAM CHEST 2 VIEWS: CPT

## 2025-08-27 ENCOUNTER — HOSPITAL ENCOUNTER (OUTPATIENT)
Facility: HOSPITAL | Age: 30
Discharge: HOME | End: 2025-08-27
Attending: STUDENT IN AN ORGANIZED HEALTH CARE EDUCATION/TRAINING PROGRAM | Admitting: OBSTETRICS & GYNECOLOGY
Payer: COMMERCIAL

## 2025-08-27 ENCOUNTER — APPOINTMENT (OUTPATIENT)
Dept: ULTRASOUND IMAGING | Facility: HOSPITAL | Age: 30
End: 2025-08-27
Payer: COMMERCIAL

## 2025-08-27 VITALS
OXYGEN SATURATION: 98 % | SYSTOLIC BLOOD PRESSURE: 105 MMHG | BODY MASS INDEX: 23.06 KG/M2 | HEART RATE: 92 BPM | HEIGHT: 61 IN | WEIGHT: 122.14 LBS | TEMPERATURE: 98.3 F | DIASTOLIC BLOOD PRESSURE: 61 MMHG

## 2025-08-27 PROCEDURE — G0463 HOSPITAL OUTPT CLINIC VISIT: HCPCS

## 2025-08-27 PROCEDURE — 76819 FETAL BIOPHYS PROFIL W/O NST: CPT
